# Patient Record
Sex: FEMALE | Race: WHITE | NOT HISPANIC OR LATINO | Employment: FULL TIME | URBAN - METROPOLITAN AREA
[De-identification: names, ages, dates, MRNs, and addresses within clinical notes are randomized per-mention and may not be internally consistent; named-entity substitution may affect disease eponyms.]

---

## 2017-01-11 ENCOUNTER — CLINICAL SUPPORT (OUTPATIENT)
Dept: OBSTETRICS AND GYNECOLOGY | Facility: CLINIC | Age: 27
End: 2017-01-11
Payer: COMMERCIAL

## 2017-01-11 ENCOUNTER — OFFICE VISIT (OUTPATIENT)
Dept: FAMILY MEDICINE | Facility: CLINIC | Age: 27
End: 2017-01-11
Payer: COMMERCIAL

## 2017-01-11 VITALS
HEIGHT: 67 IN | SYSTOLIC BLOOD PRESSURE: 107 MMHG | OXYGEN SATURATION: 98 % | TEMPERATURE: 98 F | DIASTOLIC BLOOD PRESSURE: 74 MMHG | WEIGHT: 171.94 LBS | BODY MASS INDEX: 26.99 KG/M2 | HEART RATE: 95 BPM

## 2017-01-11 DIAGNOSIS — F32.A DEPRESSION, UNSPECIFIED DEPRESSION TYPE: ICD-10-CM

## 2017-01-11 DIAGNOSIS — F41.1 GENERALIZED ANXIETY DISORDER: Primary | ICD-10-CM

## 2017-01-11 DIAGNOSIS — Z30.42 DEPO-PROVERA CONTRACEPTIVE STATUS: Primary | ICD-10-CM

## 2017-01-11 DIAGNOSIS — Z30.42 SURVEILLANCE FOR DEPO-PROVERA CONTRACEPTION: ICD-10-CM

## 2017-01-11 LAB
B-HCG UR QL: NEGATIVE
CTP QC/QA: YES

## 2017-01-11 PROCEDURE — 99214 OFFICE O/P EST MOD 30 MIN: CPT | Mod: S$GLB,,, | Performed by: FAMILY MEDICINE

## 2017-01-11 PROCEDURE — 1159F MED LIST DOCD IN RCRD: CPT | Mod: S$GLB,,, | Performed by: FAMILY MEDICINE

## 2017-01-11 PROCEDURE — 81025 URINE PREGNANCY TEST: CPT | Mod: S$GLB,,, | Performed by: OBSTETRICS & GYNECOLOGY

## 2017-01-11 PROCEDURE — 99999 PR PBB SHADOW E&M-EST. PATIENT-LVL III: CPT | Mod: PBBFAC,,, | Performed by: FAMILY MEDICINE

## 2017-01-11 PROCEDURE — 96372 THER/PROPH/DIAG INJ SC/IM: CPT | Mod: S$GLB,,, | Performed by: OBSTETRICS & GYNECOLOGY

## 2017-01-11 RX ORDER — BUSPIRONE HYDROCHLORIDE 7.5 MG/1
7.5 TABLET ORAL 2 TIMES DAILY
Qty: 60 TABLET | Refills: 2 | Status: SHIPPED | OUTPATIENT
Start: 2017-01-11 | End: 2017-01-11 | Stop reason: SDUPTHER

## 2017-01-11 RX ORDER — CLONAZEPAM 0.5 MG/1
0.5 TABLET ORAL 2 TIMES DAILY PRN
Qty: 30 TABLET | Refills: 0 | Status: SHIPPED | OUTPATIENT
Start: 2017-01-11 | End: 2017-02-06 | Stop reason: SDUPTHER

## 2017-01-11 RX ORDER — BUSPIRONE HYDROCHLORIDE 7.5 MG/1
7.5 TABLET ORAL 2 TIMES DAILY
Qty: 60 TABLET | Refills: 2 | Status: SHIPPED | OUTPATIENT
Start: 2017-01-11 | End: 2017-03-31

## 2017-01-11 RX ADMIN — MEDROXYPROGESTERONE ACETATE 150 MG: 150 INJECTION, SUSPENSION INTRAMUSCULAR at 01:01

## 2017-01-11 NOTE — MR AVS SNAPSHOT
[address]  [place] [address]  Phone: [phone]  Fax: [phone]                  Stephanie Harding   2017 3:20 PM   Office Visit    Description:  Female : 1990   Provider:  Aydin Wang MD   Department:  [facility]           Reason for Visit     Medication Problem           Diagnoses this Visit        Comments    Generalized anxiety disorder    -  Primary     Depression, unspecified depression type                To Do List           Future Appointments        Provider Department Dept Phone    2017 4:00 PM LYNN, [place] - [phone]      Goals (5 Years of Data)     None      Follow-Up and Disposition     Return in about 4 weeks (around 2017), or if symptoms worsen or fail to improve.       These Medications        Disp Refills Start End    busPIRone (BUSPAR) 7.5 MG tablet 60 tablet 2 2017    Take 1 tablet (7.5 mg total) by mouth 2 (two) times daily. - Oral    Pharmacy: [id number] [place] [facility] [address] [facility]: [phone]       clonazePAM (KLONOPIN) 0.5 MG tablet 30 tablet 0 2017    Take 1 tablet (0.5 mg total) by mouth 2 (two) times daily as needed for Anxiety (insomnia). - Oral    Pharmacy: [facility] #[id number] - [place] - [address] Ph #: [phone]         [facility] On [facility]     [facility] On Call Nurse Care Line -  Assistance  Registered nurses in the [facility] provide clinical advisement, health education, appointment booking, and other advisory services.  Call for this free service at [phone].             Medications           Message regarding Medications     Verify the changes and/or additions to your medication regime listed below are the same as discussed with your clinician today.  If any of these changes or additions are incorrect, please notify your healthcare provider.        START taking these  "NEW medications        Refills    busPIRone (BUSPAR) 7.5 MG tablet 2    Sig: Take 1 tablet (7.5 mg total) by mouth 2 (two) times daily.    Class: Normal    Route: Oral    clonazePAM (KLONOPIN) 0.5 MG tablet 0    Sig: Take 1 tablet (0.5 mg total) by mouth 2 (two) times daily as needed for Anxiety (insomnia).    Class: Print    Route: Oral           Verify that the below list of medications is an accurate representation of the medications you are currently taking.  If none reported, the list may be blank. If incorrect, please contact your healthcare provider. Carry this list with you in case of emergency.           Current Medications     buPROPion (WELLBUTRIN XL) 300 MG 24 hr tablet Take 1 tablet (300 mg total) by mouth once daily.    citalopram (CELEXA) 10 MG tablet Take 1 tablet (10 mg total) by mouth once daily.    busPIRone (BUSPAR) 7.5 MG tablet Take 1 tablet (7.5 mg total) by mouth 2 (two) times daily.    cholestyramine-aspartame (CHOLESTYRAMINE LIGHT) 4 gram PwPk Take 1 packet (4 g total) by mouth once daily.    clonazePAM (KLONOPIN) 0.5 MG tablet Take 1 tablet (0.5 mg total) by mouth 2 (two) times daily as needed for Anxiety (insomnia).    triamcinolone acetonide 0.1% (KENALOG) 0.1 % ointment Apply topically 2 (two) times daily.           Clinical Reference Information           Vital Signs - Last Recorded  Most recent update: 1/11/2017  3:34 PM by Jessy Mcmanus LPN    BP Pulse Temp Ht Wt SpO2    107/74 (BP Location: Right arm, Patient Position: Sitting, BP Method: Automatic) 95 98.2 °F (36.8 °C) (Oral) 5' 7" (1.702 m) 78 kg (171 lb 15.3 oz) 98%    BMI                26.93 kg/m2          Blood Pressure          Most Recent Value    BP  107/74      Allergies as of 1/11/2017     No Known Allergies      Immunizations Administered on Date of Encounter - 1/11/2017     None      Smoking Cessation     If you would like to quit smoking:   You may be eligible for free services if you are a [place] resident and " started smoking cigarettes before September 1, 1988.  Call the [employer]oking [employer] ([employer]) toll free at (637) 631-1222 or (857) 344-1214.   Call 5-230-QUIT-NOW if you do not meet the above criteria.

## 2017-01-11 NOTE — PROGRESS NOTES
Subjective:       Patient ID: Stephanie Harding is a 26 y.o. female.    Chief Complaint: Medication Problem    HPI Comments: 26 yr old pleasant white female with acne vulgaris, generalized anxiety disorder, presents today for evaluation of anxiety and depression.    Acne vulgaris - chronic - currently stable - she is on clindamycin gel -      Anxiety disorder/depression - chronic - and currently uncontrolled - on wellbutrin daily and also celexa - no SI/HI/hallucinations - reports have issues with her family life and small things making her more anxious and depressed mood - no motivation or energy and sleep issues - she is on meds x 7 years and they seems to have no affect during her emotional moments - she would like to change it      History as below - reviewed      Health maintenance  -labs she reports UTD  -pap UTD  -vaccines declines    Medication Refill   This is a chronic problem. The current episode started more than 1 year ago. The problem occurs constantly. The problem has been gradually improving. Associated symptoms include a rash. Pertinent negatives include no abdominal pain, anorexia, arthralgias, chest pain, chills, congestion, coughing, diaphoresis, fever, headaches, myalgias, nausea, numbness, sore throat, vertigo, vomiting or weakness. Nothing aggravates the symptoms. Treatments tried: as below. The treatment provided significant relief.   Rash   This is a chronic problem. The current episode started more than 1 year ago. The problem has been gradually improving since onset. The rash is characterized by swelling and draining. She was exposed to nothing. Pertinent negatives include no anorexia, congestion, cough, eye pain, facial edema, fever, nail changes, sore throat or vomiting. Past treatments include antibiotic cream. The treatment provided no relief. There is no history of allergies, asthma, eczema or varicella. (Acne)   Anxiety   Presents for initial visit. Onset was 1 to 5 years ago. The  problem has been rapidly improving. Symptoms include decreased concentration, excessive worry and nervous/anxious behavior. Patient reports no chest pain, confusion, depressed mood, dry mouth, feeling of choking, impotence, irritability, nausea, palpitations, restlessness or suicidal ideas. Symptoms occur rarely. The severity of symptoms is mild. Nothing aggravates the symptoms. The quality of sleep is good. Nighttime awakenings: none.     There are no known risk factors. Her past medical history is significant for anxiety/panic attacks. There is no history of asthma, bipolar disorder, CAD, chronic lung disease, depression, fibromyalgia or suicide attempts. (Acne) Past treatments include non-benzodiazephine anxiolytics. The treatment provided significant relief. Compliance with prior treatments has been good.     Review of Systems   Constitutional: Negative.  Negative for activity change, chills, diaphoresis, fever, irritability and unexpected weight change.   HENT: Negative.  Negative for congestion, dental problem, ear pain, mouth sores, sinus pressure and sore throat.    Eyes: Negative.  Negative for photophobia, pain and discharge.   Respiratory: Negative.  Negative for apnea, cough and wheezing.    Cardiovascular: Negative.  Negative for chest pain and palpitations.   Gastrointestinal: Negative.  Negative for abdominal pain, anorexia, blood in stool, nausea and vomiting.   Endocrine: Negative.  Negative for cold intolerance, polydipsia and polyuria.   Genitourinary: Negative.  Negative for dyspareunia, flank pain, hematuria, impotence, urgency and vaginal discharge.   Musculoskeletal: Negative.  Negative for arthralgias and myalgias.   Skin: Positive for rash. Negative for color change and nail changes.   Allergic/Immunologic: Negative.  Negative for environmental allergies and immunocompromised state.   Neurological: Negative.  Negative for vertigo, speech difficulty, weakness, numbness and headaches.    Hematological: Negative.    Psychiatric/Behavioral: Positive for decreased concentration, dysphoric mood and sleep disturbance. Negative for agitation, confusion, hallucinations and suicidal ideas. The patient is nervous/anxious.        PMH/PSH/FH/SH/MED/ALLERGY reviewed    Objective:       Vitals:    01/11/17 1533   BP: 107/74   Pulse: 95   Temp: 98.2 °F (36.8 °C)       Physical Exam   Constitutional: She is oriented to person, place, and time. She appears well-developed and well-nourished. No distress.   HENT:   Head: Normocephalic and atraumatic.   Right Ear: External ear normal.   Left Ear: External ear normal.   Nose: Nose normal.   Mouth/Throat: Oropharynx is clear and moist. No oropharyngeal exudate.   Mild to moderate nodular acne on face   Eyes: Conjunctivae and EOM are normal. Pupils are equal, round, and reactive to light. Right eye exhibits no discharge. Left eye exhibits no discharge. No scleral icterus.   Neck: Normal range of motion. Neck supple. No JVD present. No tracheal deviation present. No thyromegaly present.   Cardiovascular: Normal rate, regular rhythm, normal heart sounds and intact distal pulses.  Exam reveals no gallop and no friction rub.    No murmur heard.  Pulmonary/Chest: Effort normal and breath sounds normal. No respiratory distress. She has no wheezes. She has no rales. She exhibits no tenderness.   Abdominal: Soft. Bowel sounds are normal. She exhibits no distension and no mass. There is no tenderness. There is no rebound and no guarding. No hernia.   Musculoskeletal: Normal range of motion. She exhibits no edema or tenderness.   Lymphadenopathy:     She has no cervical adenopathy.   Neurological: She is alert and oriented to person, place, and time. She has normal reflexes. She displays normal reflexes. No cranial nerve deficit. She exhibits normal muscle tone. Coordination normal.   Skin: Skin is warm and dry. Rash noted. She is not diaphoretic. There is erythema.    Psychiatric: Her behavior is normal. Judgment and thought content normal.   Anxious and depressed mood       Assessment:       1. Generalized anxiety disorder    2. Depression, unspecified depression type        Plan:       Stephanie was seen today for medication problem.    Diagnoses and all orders for this visit:    Generalized anxiety disorder  -     Discontinue: busPIRone (BUSPAR) 7.5 MG tablet; Take 1 tablet (7.5 mg total) by mouth 2 (two) times daily.  -     clonazePAM (KLONOPIN) 0.5 MG tablet; Take 1 tablet (0.5 mg total) by mouth 2 (two) times daily as needed for Anxiety (insomnia).  -     busPIRone (BUSPAR) 7.5 MG tablet; Take 1 tablet (7.5 mg total) by mouth 2 (two) times daily.    Depression, unspecified depression type  -     Discontinue: busPIRone (BUSPAR) 7.5 MG tablet; Take 1 tablet (7.5 mg total) by mouth 2 (two) times daily.  -     busPIRone (BUSPAR) 7.5 MG tablet; Take 1 tablet (7.5 mg total) by mouth 2 (two) times daily.      MEENU/depression  =slowly wean celexa and wellbutrin x 1 month  -start buspar daily  -klonopin as needed once or twice/day  -recommend relaxation techniques as well  -SI/ER precautions given and also we discussed need for Psychiatry referral if meds not working in future    Spent adequate time in obtaining history and explaining differentials    40 minutes spent during this visit of which greater than 50% devoted to face-face counseling and coordination of care regarding diagnosis and management plan    Return in about 4 weeks (around 2/8/2017), or if symptoms worsen or fail to improve.

## 2017-01-11 NOTE — PROGRESS NOTES
UPT negative   Administered Depo Provera 150 mg/ml   1 ml IM inj in R upper quad gluteus  Patient Tolerated well.  Patient instructed to return on 4-4-17 for next injection.   Patient verbalized understanding.   Lot:[id number]  Exp: 08/2019

## 2017-01-20 ENCOUNTER — PATIENT MESSAGE (OUTPATIENT)
Dept: SLEEP MEDICINE | Facility: CLINIC | Age: 27
End: 2017-01-20

## 2017-01-26 ENCOUNTER — TELEPHONE (OUTPATIENT)
Dept: SLEEP MEDICINE | Facility: CLINIC | Age: 27
End: 2017-01-26

## 2017-01-26 ENCOUNTER — PATIENT MESSAGE (OUTPATIENT)
Dept: GASTROENTEROLOGY | Facility: CLINIC | Age: 27
End: 2017-01-26

## 2017-01-26 DIAGNOSIS — G47.30 SLEEP APNEA, UNSPECIFIED TYPE: Primary | ICD-10-CM

## 2017-01-26 NOTE — TELEPHONE ENCOUNTER
I will order a HOME STUDY.    Benja, Please consider allowing her boyfriend to  [place] (she works at [employer] and can not leave work)    Thank you!    ---------------------------------------------------    Dear Ms. Harding,    Yes, I can order a home study.  I think we should do it first, because if we do not get very good results with this one, insurance will pay for the one done in the sleep lab, so please save your resources.   I will ask the lab to allow your bf to  the study device (normally they would want you to pick it up to be able to teach you to place it correctly to insure better outcomes).    SLEEP LAB ([facility] or [facility]) will contact you to schedule the HOME sleep study. Their number is [phone] (ext 1). Please call them if you do not hear from them in 10 business days from now.  The [facility] is located on 7th floor of the [facility]; [place] lab is located in [facility].    SLEEP CLINIC (my assistant) will call you when the sleep study results are ready - if you have not heard from us by 2 weeks from the date of the study, please call [phone] (ext 2) or you can use [facility] to contact me.    You are advised to abstain from driving should you feel sleepy or drowsy.        Sincerely,    Marley Seth MD    ===View-only below this line===      ----- Message -----     From: Stephanie Harding     Sent: 1/26/2017  3:54 PM CST       To: Marley Seth MD  Subject: RE: Visit Follow-Up    I am also going to consider paying the out of pocket price to get the in clinic test done, if this is possible. I remember you saying that the at home one was not as accurate or didn't provide as much information as the one that was performed in the clinic? Do you know if it would still be possible to do it in a clinic without insurance?  ----- Message -----  From: Marley Seth MD  Sent: 1/23/2017  1:32 PM CST  To: Stephanie Harding  Subject: RE: Visit Follow-Up        Dear Ms.  "Harding       I can just re-order the sleep study for you and see you once the results are ready.  Unfortunately, we are not doing [facility] anymore (we only have [facility] and [facility] for inlab and [facility] only for home studies).  Last time I order you a sleep lab study (in the hospital overnight) - did you insurance deny it and request a home study instead or was that just your preference?  Please let me know, and I will reorder the study for you.      Sincerely,    Marley Seth MD          Sincerely,    Marley Seth MD      ----- Message -----     From: Stephanie Harding     Sent: 1/20/2017  1:06 PM CST       To: Marley Seth MD  Subject: Visit Follow-Up    [facility] doctor,    I know that it has been a couple of months since I have seen you and I was not able to complete the sleep study. I ended up moving to the [facility], so I was not local to any of the clinics. I am writing you with a concern not only for myself but for my relationship. My main concern is that I don't feel well rested when I wake up in the morning, even after 8-9 hours of sleep. My boyfriend states that I no longer "stop" breathing while I am sleeping, but I snore very loudly. It has so much gotten to the point that we no longer sleep in the same bed. If you think the test you had ordered would help get to the bottom of my issue, please let me know where in [place] I may be able to  at the at home test. Otherwise I am thinking that I will need to schedule an appointment to be seen, but noticed that you are several months booked out. Please let me know your thoughts, as I am not sure I will be able to wait until March or April.    Thanks,  Stephanie  "

## 2017-02-04 ENCOUNTER — PATIENT MESSAGE (OUTPATIENT)
Dept: FAMILY MEDICINE | Facility: CLINIC | Age: 27
End: 2017-02-04

## 2017-02-04 DIAGNOSIS — F41.1 GENERALIZED ANXIETY DISORDER: ICD-10-CM

## 2017-02-06 RX ORDER — CLONAZEPAM 1 MG/1
1 TABLET ORAL 2 TIMES DAILY PRN
Qty: 60 TABLET | Refills: 0 | Status: SHIPPED | OUTPATIENT
Start: 2017-02-06 | End: 2017-02-19 | Stop reason: SDUPTHER

## 2017-02-08 ENCOUNTER — TELEPHONE (OUTPATIENT)
Dept: SLEEP MEDICINE | Facility: OTHER | Age: 27
End: 2017-02-08

## 2017-02-08 ENCOUNTER — PATIENT MESSAGE (OUTPATIENT)
Dept: FAMILY MEDICINE | Facility: CLINIC | Age: 27
End: 2017-02-08

## 2017-02-15 ENCOUNTER — PATIENT MESSAGE (OUTPATIENT)
Dept: GASTROENTEROLOGY | Facility: CLINIC | Age: 27
End: 2017-02-15

## 2017-02-15 NOTE — TELEPHONE ENCOUNTER
Spoke with pt and offered to move her appointment up with Dr Alatorre but pt wants to keep 3/8/17. Pt was last seen by Dr Nam in 3/2016 so pt needs to see Dr Alatorre before being schedule for Colonoscopy.

## 2017-02-17 ENCOUNTER — PATIENT MESSAGE (OUTPATIENT)
Dept: FAMILY MEDICINE | Facility: CLINIC | Age: 27
End: 2017-02-17

## 2017-02-17 DIAGNOSIS — F41.1 GENERALIZED ANXIETY DISORDER: ICD-10-CM

## 2017-02-19 NOTE — TELEPHONE ENCOUNTER
Prescription will be canceled at [facility], patient requesting RX sent to [facility].

## 2017-02-20 RX ORDER — CLONAZEPAM 1 MG/1
1 TABLET ORAL 2 TIMES DAILY PRN
Qty: 60 TABLET | Refills: 2 | Status: SHIPPED | OUTPATIENT
Start: 2017-02-20 | End: 2017-02-21 | Stop reason: SDUPTHER

## 2017-02-21 ENCOUNTER — PATIENT MESSAGE (OUTPATIENT)
Dept: FAMILY MEDICINE | Facility: CLINIC | Age: 27
End: 2017-02-21

## 2017-02-21 ENCOUNTER — PATIENT MESSAGE (OUTPATIENT)
Dept: GASTROENTEROLOGY | Facility: CLINIC | Age: 27
End: 2017-02-21

## 2017-02-21 DIAGNOSIS — F41.1 GENERALIZED ANXIETY DISORDER: ICD-10-CM

## 2017-02-21 RX ORDER — CLONAZEPAM 1 MG/1
1 TABLET ORAL 2 TIMES DAILY PRN
Qty: 60 TABLET | Refills: 2 | Status: SHIPPED | OUTPATIENT
Start: 2017-02-21 | End: 2017-03-31

## 2017-02-22 RX ORDER — DICYCLOMINE HYDROCHLORIDE 20 MG/1
20 TABLET ORAL EVERY 6 HOURS
Qty: 120 TABLET | Refills: 0 | Status: SHIPPED | OUTPATIENT
Start: 2017-02-22 | End: 2017-03-24

## 2017-03-01 ENCOUNTER — TELEPHONE (OUTPATIENT)
Dept: GASTROENTEROLOGY | Facility: CLINIC | Age: 27
End: 2017-03-01

## 2017-03-01 ENCOUNTER — OFFICE VISIT (OUTPATIENT)
Dept: GASTROENTEROLOGY | Facility: CLINIC | Age: 27
End: 2017-03-01
Payer: COMMERCIAL

## 2017-03-01 VITALS
SYSTOLIC BLOOD PRESSURE: 109 MMHG | HEIGHT: 67 IN | BODY MASS INDEX: 27.09 KG/M2 | WEIGHT: 172.63 LBS | HEART RATE: 86 BPM | DIASTOLIC BLOOD PRESSURE: 70 MMHG

## 2017-03-01 DIAGNOSIS — R10.30 LOWER ABDOMINAL PAIN: Primary | Chronic | ICD-10-CM

## 2017-03-01 DIAGNOSIS — F32.A DEPRESSION, UNSPECIFIED DEPRESSION TYPE: ICD-10-CM

## 2017-03-01 DIAGNOSIS — F41.1 GENERALIZED ANXIETY DISORDER: ICD-10-CM

## 2017-03-01 DIAGNOSIS — K58.0 IRRITABLE BOWEL SYNDROME WITH DIARRHEA: Chronic | ICD-10-CM

## 2017-03-01 DIAGNOSIS — R11.2 NON-INTRACTABLE VOMITING WITH NAUSEA, UNSPECIFIED VOMITING TYPE: ICD-10-CM

## 2017-03-01 PROCEDURE — 1160F RVW MEDS BY RX/DR IN RCRD: CPT | Mod: S$GLB,,, | Performed by: INTERNAL MEDICINE

## 2017-03-01 PROCEDURE — 99215 OFFICE O/P EST HI 40 MIN: CPT | Mod: S$GLB,,, | Performed by: INTERNAL MEDICINE

## 2017-03-01 PROCEDURE — 99999 PR PBB SHADOW E&M-EST. PATIENT-LVL III: CPT | Mod: PBBFAC,,, | Performed by: INTERNAL MEDICINE

## 2017-03-01 RX ORDER — PANTOPRAZOLE SODIUM 40 MG/1
40 TABLET, DELAYED RELEASE ORAL DAILY
Qty: 30 TABLET | Refills: 3 | Status: SHIPPED | OUTPATIENT
Start: 2017-03-01 | End: 2017-09-20

## 2017-03-01 NOTE — PATIENT INSTRUCTIONS
-EGD to be schedule at [facility]  -Abdominal ultrasound  -Blood work to be done today\  -Trial of Protonix 40 mg every morning before meals  -Consideration of psychiatric/psychological counseling discussed with patient  -Recommended trial of probiotic therapy

## 2017-03-01 NOTE — MR AVS SNAPSHOT
[facility]  [address]  [address]  [place] [address]  Phone: [phone]                  Stephanie Harding   3/1/2017 8:00 AM   Office Visit    Description:  Female : 1990   Provider:  Jono Alatorre MD   Department:  [place] - Gastroenterology           Reason for Visit     GI Problem           Diagnoses this Visit        Comments    Lower abdominal pain    -  Primary Increased pain immediately postprandially, with immediate relief by having a bowel movement    Depression, unspecified depression type         Generalized anxiety disorder         Irritable bowel syndrome with diarrhea     Constipation generated with cholestyramine use; no weight loss; no bright red blood per rectum    Non-intractable vomiting with nausea, unspecified vomiting type                To Do List           Future Appointments        Provider Department Dept Phone    3/8/2017 1:00 PM HOME STUDY, SLEEP [facility] [phone]    2017 4:00 PM INJECTION, [place] - [phone]      Goals (5 Years of Data)     None      [facility]     [facility] Care [facility] -  Assistance  Registered nurses in the [facility] provide clinical advisement, health education, appointment booking, and other advisory services.  Call for this free service at [phone].             Medications           Message regarding Medications     Verify the changes and/or additions to your medication regime listed below are the same as discussed with your clinician today.  If any of these changes or additions are incorrect, please notify your healthcare provider.        STOP taking these medications     buPROPion (WELLBUTRIN XL) 300 MG 24 hr tablet Take 1 tablet (300 mg total) by mouth once daily.    citalopram (CELEXA) 10 MG tablet Take 1 tablet (10 mg total) by mouth once daily.    cholestyramine-aspartame (CHOLESTYRAMINE LIGHT) 4 gram PwPk Take 1  packet (4 g total) by mouth once daily.    triamcinolone acetonide 0.1% (KENALOG) 0.1 % ointment Apply topically 2 (two) times daily.           Verify that the below list of medications is an accurate representation of the medications you are currently taking.  If none reported, the list may be blank. If incorrect, please contact your healthcare provider. Carry this list with you in case of emergency.           Current Medications     busPIRone (BUSPAR) 7.5 MG tablet Take 1 tablet (7.5 mg total) by mouth 2 (two) times daily.    clonazePAM (KLONOPIN) 1 MG tablet Take 1 tablet (1 mg total) by mouth 2 (two) times daily as needed for Anxiety (insomnia).    dicyclomine (BENTYL) 20 mg tablet Take 1 tablet (20 mg total) by mouth every 6 (six) hours.           Clinical Reference Information           Your Vitals Were     BP                   109/70 (BP Location: Right arm, Patient Position: Sitting, BP Method: Automatic)           Blood Pressure          Most Recent Value    BP  109/70      Allergies as of 3/1/2017     No Known Allergies      Immunizations Administered on Date of Encounter - 3/1/2017     None      Orders Placed During Today's Visit      Normal Orders This Visit    Case request GI: ESOPHAGOGASTRODUODENOSCOPY (EGD)     Future Labs/Procedures Expected by Expires    CBC auto differential  3/1/2017 3/1/2018    Comprehensive metabolic panel  3/1/2017 3/1/2018    TSH  3/1/2017 3/1/2018    US Abdomen Complete  3/1/2017 3/2/2018      Instructions    -EGD to be schedule at [facility]  -Abdominal ultrasound  -Blood work to be done today\  -Trial of Protonix 40 mg every morning before meals  -Consideration of psychiatric/psychological counseling discussed with patient  -Recommended trial of probiotic therapy       Smoking Cessation     If you would like to quit smoking:   You may be eligible for free services if you are a [place] resident and started smoking cigarettes before September 1, 1988.  Call the Smoking  [facility] ([facility]) toll free at [phone] or [phone] [phone].   Call 1-800-QUIT-NOW if you do not meet the above criteria.            Language Assistance Services     ATTENTION: Language assistance services are available, free of charge. Please call [phone].      ATENCIÓN: Si habla dorieañol, tiene a dorantes disposición servicios gratuitos de asistencia lingüística. Llame al [phone].     [facility] Ý: N?u b?n nói Ti?ng Vi?t, có các d?ch v? h? tr? ngôn ng? mi?n phí dành cho b?n. G?i s? [phone].         [facility] complies with applicable Federal civil rights laws and does not discriminate on the basis of race, color, national origin, age, disability, or sex.

## 2017-03-01 NOTE — PROGRESS NOTES
[facility] / [facility]     Chief complaint: GI Problem (upset stomach )       PCP: Aydin Wang MD    HPI: 27 y.o. female presents for her first follow-up visit since her initial evaluation in March 2016.  In the interim, she has decided Questran therapy caused uncomfortable constipation for her and ceased this medication although did control the loose stools.  She also decided not to complete the recommended EGD evaluation.  At present, she has recently undergone a change in her antidepressant medications, and feels that she is having daily left upper quadrant and/or lower abdominal pains that spike in intensity immediately postprandially, and are generally relieved with bowel movements.  Her normal bowel pattern currently is 1 bowel movement 4-10 times daily, generally after she eats anything.  There is no associated bright red blood per rectum or melena.  She also has nausea approximately every other day, and this nausea lasts for a couple of hours.  She notes that all her symptoms are increased with increased stress.  Despite the GI symptoms, the patient's weight has actually increased slightly over the past year and thus far, she has not had any benefit from Bentyl therapy which was recently ascribed for her.    I reviewed with the patient the results of her February 2016 lab data, all which was normal, and I also discussed the concept of irritable bowel syndrome, the linkage to anxiety/stress disorder, and possible need for probiotic therapy and/or improved control of her anxiety/depression symptoms.    Patient is accompanied by significant other.    Past Medical History:   Diagnosis Date    Anxiety     HSV-2 (herpes simplex virus 2) infection 2014       Review of Systems  Review of Systems   Constitutional: Positive for fatigue. Negative for appetite change, chills, diaphoresis, fever and unexpected weight change.   HENT: Negative for postnasal drip, trouble swallowing and voice change.    Eyes:  "Negative for visual disturbance.   Respiratory: Negative for cough, chest tightness, shortness of breath and wheezing.    Cardiovascular: Negative.    Gastrointestinal: Positive for abdominal distention, abdominal pain, diarrhea, nausea and vomiting. Negative for anal bleeding, blood in stool, constipation and rectal pain.   Endocrine: Negative for cold intolerance and polyuria.   Genitourinary: Negative for difficulty urinating, frequency, urgency and vaginal bleeding.   Musculoskeletal: Negative for arthralgias, back pain and gait problem.   Skin: Negative.         Multiple tattoos obtained over the years   Allergic/Immunologic: Negative for environmental allergies and food allergies.   Neurological: Negative for seizures, speech difficulty and headaches.   Hematological: Does not bruise/bleed easily.   Psychiatric/Behavioral: The patient is nervous/anxious.        Physical Examination  /70 (BP Location: Right arm, Patient Position: Sitting, BP Method: Automatic)  Pulse 86  Ht 5' 7" (1.702 m)  Wt 78.3 kg (172 lb 9.9 oz)  BMI 27.04 kg/m2  Wt Readings from Last 1 Encounters:   03/01/17 0823 78.3 kg (172 lb 9.9 oz)       Physical Exam   Constitutional: She is oriented to person, place, and time. Vital signs are normal. She appears well-developed and well-nourished.   HENT:   Head: Normocephalic and atraumatic.   Right Ear: External ear normal.   Left Ear: External ear normal.   Nose: Nose normal.   Mouth/Throat: Uvula is midline and oropharynx is clear and moist. No oropharyngeal exudate.   Eyes: Conjunctivae, EOM and lids are normal. Pupils are equal, round, and reactive to light. No scleral icterus.   Neck: Trachea normal. Neck supple. No JVD present. No tracheal tenderness and no muscular tenderness present. No tracheal deviation and no edema present. No thyromegaly present.   Cardiovascular: Normal rate, regular rhythm, normal heart sounds and normal pulses.  Exam reveals no S3 and no S4.    No murmur " heard.  Pulmonary/Chest: Effort normal and breath sounds normal. No stridor.   Abdominal: Soft. Normal appearance and bowel sounds are normal. She exhibits no distension, no pulsatile liver, no fluid wave, no abdominal bruit, no pulsatile midline mass and no mass. There is no hepatosplenomegaly. There is no tenderness. There is no rebound and no guarding. No hernia.   Mild to moderate obesity, nontender throughout examined area   Musculoskeletal: Normal range of motion. She exhibits no edema.   Neurological: She is alert and oriented to person, place, and time. She exhibits normal muscle tone. Coordination normal.   Skin: Skin is warm and dry. No petechiae and no rash noted. No cyanosis. Nails show no clubbing.   Psychiatric: She has a normal mood and affect. Her speech is normal and behavior is normal. Judgment normal. Cognition and memory are normal.   Nursing note and vitals reviewed.    Labs:   Complete Blood Count  Lab Results   Component Value Date    RBC 4.66 02/26/2016    HGB 14.1 02/26/2016    HCT 42.6 02/26/2016    MCV 91 02/26/2016    MCH 30.3 02/26/2016    MCHC 33.1 02/26/2016    RDW 13.1 02/26/2016     02/26/2016    MPV 8.8 (L) 02/26/2016    GRAN 4.9 02/26/2016    GRAN 60.7 02/26/2016    LYMPH 2.4 02/26/2016    LYMPH 29.7 02/26/2016    MONO 0.6 02/26/2016    MONO 7.2 02/26/2016    EOS 0.1 02/26/2016    BASO 0.03 02/26/2016    EOSINOPHIL 1.6 02/26/2016    BASOPHIL 0.4 02/26/2016    DIFFMETHOD Automated 02/26/2016       Comprehensive Metabolic Panel  Lab Results   Component Value Date    GLU 83 02/26/2016    BUN 15 02/26/2016    CREATININE 1.0 02/26/2016     02/26/2016    K 3.8 02/26/2016     02/26/2016    PROT 7.3 02/26/2016    ALBUMIN 3.9 02/26/2016    BILITOT 0.4 02/26/2016    AST 23 02/26/2016    ALKPHOS 84 02/26/2016    CO2 24 02/26/2016    ALT 22 02/26/2016    ANIONGAP 8 02/26/2016    EGFRNONAA >60 02/26/2016    ESTGFRAFRICA >60 02/26/2016       TSH  Lab Results   Component Value  Date    TSH 1.204 02/26/2016         Assessment:   Lower abdominal pain  Comments:  Increased pain immediately postprandially, with immediate relief by having a bowel movement  Orders:  -     US Abdomen Complete; Future; Expected date: 3/1/17  -     CBC auto differential; Future; Expected date: 3/1/17  -     Comprehensive metabolic panel; Future; Expected date: 3/1/17  -     TSH; Future; Expected date: 3/1/17  -     Case request GI: ESOPHAGOGASTRODUODENOSCOPY (EGD)    Depression, unspecified depression type    Generalized anxiety disorder    Irritable bowel syndrome with diarrhea  Comments:  Constipation generated with cholestyramine use; no weight loss; no bright red blood per rectum  Orders:  -     US Abdomen Complete; Future; Expected date: 3/1/17  -     CBC auto differential; Future; Expected date: 3/1/17  -     Comprehensive metabolic panel; Future; Expected date: 3/1/17  -     TSH; Future; Expected date: 3/1/17  -     Case request GI: ESOPHAGOGASTRODUODENOSCOPY (EGD)    Non-intractable vomiting with nausea, unspecified vomiting type    Plan:    No Follow-up on file.    Orders Placed This Encounter   Procedures    US Abdomen Complete    CBC auto differential    Comprehensive metabolic panel    TSH     -EGD to be schedule at [facility]  -Abdominal ultrasound  -Blood work to be done today\  -Trial of Protonix 40 mg every morning before meals  -Consideration of psychiatric/psychological counseling discussed with patient  -Recommended trial of probiotic therapy      Jono Alatorre MD, FACP, FACG, AGAF [facility]  [address], [address], [place] [address]  Phone: [phone] Fax: [phone]    [address], [address], [place] [address]  Phone: [phone] Fax: [phone]    - Portions of this note were dictated using voice recognition software and may contain dictation related errors in spelling / grammar / syntax not discovered on text review.

## 2017-03-07 ENCOUNTER — TELEPHONE (OUTPATIENT)
Dept: SLEEP MEDICINE | Facility: OTHER | Age: 27
End: 2017-03-07

## 2017-03-08 ENCOUNTER — HOSPITAL ENCOUNTER (OUTPATIENT)
Dept: SLEEP MEDICINE | Facility: OTHER | Age: 27
Discharge: HOME | End: 2017-03-08
Attending: PSYCHIATRY & NEUROLOGY
Payer: COMMERCIAL

## 2017-03-08 ENCOUNTER — HOSPITAL ENCOUNTER (OUTPATIENT)
Dept: RADIOLOGY | Facility: HOSPITAL | Age: 27
Discharge: HOME | End: 2017-03-08
Attending: INTERNAL MEDICINE
Payer: COMMERCIAL

## 2017-03-08 DIAGNOSIS — K58.0 IRRITABLE BOWEL SYNDROME WITH DIARRHEA: Chronic | ICD-10-CM

## 2017-03-08 DIAGNOSIS — G47.30 SLEEP APNEA, UNSPECIFIED TYPE: ICD-10-CM

## 2017-03-08 DIAGNOSIS — R10.30 LOWER ABDOMINAL PAIN: Chronic | ICD-10-CM

## 2017-03-08 DIAGNOSIS — G47.33 OSA (OBSTRUCTIVE SLEEP APNEA): ICD-10-CM

## 2017-03-08 PROCEDURE — 95800 SLP STDY UNATTENDED: CPT

## 2017-03-08 PROCEDURE — 76700 US EXAM ABDOM COMPLETE: CPT | Mod: 26,,, | Performed by: RADIOLOGY

## 2017-03-08 PROCEDURE — 76700 US EXAM ABDOM COMPLETE: CPT | Mod: TC,PO

## 2017-03-08 PROCEDURE — 95810 POLYSOM 6/> YRS 4/> PARAM: CPT | Mod: 26,,, | Performed by: PSYCHIATRY & NEUROLOGY

## 2017-03-09 ENCOUNTER — ANESTHESIA (OUTPATIENT)
Dept: ENDOSCOPY | Facility: HOSPITAL | Age: 27
End: 2017-03-09
Payer: COMMERCIAL

## 2017-03-09 ENCOUNTER — SURGERY (OUTPATIENT)
Age: 27
End: 2017-03-09

## 2017-03-09 ENCOUNTER — HOSPITAL ENCOUNTER (OUTPATIENT)
Facility: HOSPITAL | Age: 27
Discharge: HOME | End: 2017-03-09
Attending: INTERNAL MEDICINE | Admitting: INTERNAL MEDICINE
Payer: COMMERCIAL

## 2017-03-09 ENCOUNTER — ANESTHESIA EVENT (OUTPATIENT)
Dept: ENDOSCOPY | Facility: HOSPITAL | Age: 27
End: 2017-03-09
Payer: COMMERCIAL

## 2017-03-09 VITALS
HEART RATE: 62 BPM | RESPIRATION RATE: 20 BRPM | OXYGEN SATURATION: 100 % | TEMPERATURE: 97 F | DIASTOLIC BLOOD PRESSURE: 59 MMHG | WEIGHT: 170 LBS | BODY MASS INDEX: 26.68 KG/M2 | SYSTOLIC BLOOD PRESSURE: 99 MMHG | HEIGHT: 67 IN

## 2017-03-09 DIAGNOSIS — R11.2 NON-INTRACTABLE VOMITING WITH NAUSEA, UNSPECIFIED VOMITING TYPE: ICD-10-CM

## 2017-03-09 DIAGNOSIS — R10.12 LEFT UPPER QUADRANT ABDOMINAL PAIN OF UNKNOWN ETIOLOGY: ICD-10-CM

## 2017-03-09 DIAGNOSIS — R10.30 LOWER ABDOMINAL PAIN: ICD-10-CM

## 2017-03-09 DIAGNOSIS — K58.0 IRRITABLE BOWEL SYNDROME WITH DIARRHEA: Primary | ICD-10-CM

## 2017-03-09 DIAGNOSIS — R11.2 NAUSEA & VOMITING: ICD-10-CM

## 2017-03-09 LAB
B-HCG UR QL: NEGATIVE
CTP QC/QA: YES

## 2017-03-09 PROCEDURE — 25000003 PHARM REV CODE 250: Performed by: NURSE ANESTHETIST, CERTIFIED REGISTERED

## 2017-03-09 PROCEDURE — 37000008 HC ANESTHESIA 1ST 15 MINUTES: Performed by: INTERNAL MEDICINE

## 2017-03-09 PROCEDURE — 88305 TISSUE EXAM BY PATHOLOGIST: CPT | Performed by: PATHOLOGY

## 2017-03-09 PROCEDURE — 63600175 PHARM REV CODE 636 W HCPCS: Performed by: NURSE ANESTHETIST, CERTIFIED REGISTERED

## 2017-03-09 PROCEDURE — 81025 URINE PREGNANCY TEST: CPT | Performed by: INTERNAL MEDICINE

## 2017-03-09 PROCEDURE — 43239 EGD BIOPSY SINGLE/MULTIPLE: CPT | Mod: ,,, | Performed by: INTERNAL MEDICINE

## 2017-03-09 PROCEDURE — 37000009 HC ANESTHESIA EA ADD 15 MINS: Performed by: INTERNAL MEDICINE

## 2017-03-09 PROCEDURE — 25000003 PHARM REV CODE 250: Performed by: INTERNAL MEDICINE

## 2017-03-09 PROCEDURE — 43239 EGD BIOPSY SINGLE/MULTIPLE: CPT | Performed by: INTERNAL MEDICINE

## 2017-03-09 PROCEDURE — 88305 TISSUE EXAM BY PATHOLOGIST: CPT | Mod: 26,,, | Performed by: PATHOLOGY

## 2017-03-09 PROCEDURE — 27201012 HC FORCEPS, HOT/COLD, DISP: Performed by: INTERNAL MEDICINE

## 2017-03-09 RX ORDER — FENTANYL CITRATE 50 UG/ML
INJECTION, SOLUTION INTRAMUSCULAR; INTRAVENOUS
Status: DISCONTINUED | OUTPATIENT
Start: 2017-03-09 | End: 2017-03-09

## 2017-03-09 RX ORDER — PROPOFOL 10 MG/ML
VIAL (ML) INTRAVENOUS CONTINUOUS PRN
Status: DISCONTINUED | OUTPATIENT
Start: 2017-03-09 | End: 2017-03-09

## 2017-03-09 RX ORDER — LIDOCAINE HCL/PF 100 MG/5ML
SYRINGE (ML) INTRAVENOUS
Status: DISCONTINUED | OUTPATIENT
Start: 2017-03-09 | End: 2017-03-09

## 2017-03-09 RX ORDER — PROPOFOL 10 MG/ML
VIAL (ML) INTRAVENOUS
Status: DISCONTINUED | OUTPATIENT
Start: 2017-03-09 | End: 2017-03-09

## 2017-03-09 RX ORDER — SODIUM CHLORIDE 9 MG/ML
INJECTION, SOLUTION INTRAVENOUS CONTINUOUS
Status: DISCONTINUED | OUTPATIENT
Start: 2017-03-10 | End: 2017-03-09 | Stop reason: HOSPADM

## 2017-03-09 RX ADMIN — FENTANYL CITRATE 50 MCG: 50 INJECTION, SOLUTION INTRAMUSCULAR; INTRAVENOUS at 01:03

## 2017-03-09 RX ADMIN — PROPOFOL 40 MG: 10 INJECTION, EMULSION INTRAVENOUS at 01:03

## 2017-03-09 RX ADMIN — PROPOFOL 60 MG: 10 INJECTION, EMULSION INTRAVENOUS at 01:03

## 2017-03-09 RX ADMIN — PROPOFOL 150 MCG/KG/MIN: 10 INJECTION, EMULSION INTRAVENOUS at 01:03

## 2017-03-09 RX ADMIN — SODIUM CHLORIDE: 0.9 INJECTION, SOLUTION INTRAVENOUS at 12:03

## 2017-03-09 RX ADMIN — LIDOCAINE HYDROCHLORIDE 100 MG: 20 INJECTION, SOLUTION INTRAVENOUS at 01:03

## 2017-03-09 NOTE — H&P (VIEW-ONLY)
[facility] / [place]     Chief complaint: GI Problem (upset stomach )       PCP: Aydin Wang MD    HPI: 27 y.o. female presents for her first follow-up visit since her initial evaluation in March 2016.  In the interim, she has decided Questran therapy caused uncomfortable constipation for her and ceased this medication although did control the loose stools.  She also decided not to complete the recommended EGD evaluation.  At present, she has recently undergone a change in her antidepressant medications, and feels that she is having daily left upper quadrant and/or lower abdominal pains that spike in intensity immediately postprandially, and are generally relieved with bowel movements.  Her normal bowel pattern currently is 1 bowel movement 4-10 times daily, generally after she eats anything.  There is no associated bright red blood per rectum or melena.  She also has nausea approximately every other day, and this nausea lasts for a couple of hours.  She notes that all her symptoms are increased with increased stress.  Despite the GI symptoms, the patient's weight has actually increased slightly over the past year and thus far, she has not had any benefit from Bentyl therapy which was recently ascribed for her.    I reviewed with the patient the results of her February 2016 lab data, all which was normal, and I also discussed the concept of irritable bowel syndrome, the linkage to anxiety/stress disorder, and possible need for probiotic therapy and/or improved control of her anxiety/depression symptoms.    Patient is accompanied by significant other.    Past Medical History:   Diagnosis Date    Anxiety     HSV-2 (herpes simplex virus 2) infection 2014       Review of Systems  Review of Systems   Constitutional: Positive for fatigue. Negative for appetite change, chills, diaphoresis, fever and unexpected weight change.   HENT: Negative for postnasal drip, trouble swallowing and voice change.    Eyes:  "Negative for visual disturbance.   Respiratory: Negative for cough, chest tightness, shortness of breath and wheezing.    Cardiovascular: Negative.    Gastrointestinal: Positive for abdominal distention, abdominal pain, diarrhea, nausea and vomiting. Negative for anal bleeding, blood in stool, constipation and rectal pain.   Endocrine: Negative for cold intolerance and polyuria.   Genitourinary: Negative for difficulty urinating, frequency, urgency and vaginal bleeding.   Musculoskeletal: Negative for arthralgias, back pain and gait problem.   Skin: Negative.         Multiple tattoos obtained over the years   Allergic/Immunologic: Negative for environmental allergies and food allergies.   Neurological: Negative for seizures, speech difficulty and headaches.   Hematological: Does not bruise/bleed easily.   Psychiatric/Behavioral: The patient is nervous/anxious.        Physical Examination  /70 (BP Location: Right arm, Patient Position: Sitting, BP Method: Automatic)  Pulse 86  Ht 5' 7" (1.702 m)  Wt 78.3 kg (172 lb 9.9 oz)  BMI 27.04 kg/m2  Wt Readings from Last 1 Encounters:   03/01/17 0823 78.3 kg (172 lb 9.9 oz)       Physical Exam   Constitutional: She is oriented to person, place, and time. Vital signs are normal. She appears well-developed and well-nourished.   HENT:   Head: Normocephalic and atraumatic.   Right Ear: External ear normal.   Left Ear: External ear normal.   Nose: Nose normal.   Mouth/Throat: Uvula is midline and oropharynx is clear and moist. No oropharyngeal exudate.   Eyes: Conjunctivae, EOM and lids are normal. Pupils are equal, round, and reactive to light. No scleral icterus.   Neck: Trachea normal. Neck supple. No JVD present. No tracheal tenderness and no muscular tenderness present. No tracheal deviation and no edema present. No thyromegaly present.   Cardiovascular: Normal rate, regular rhythm, normal heart sounds and normal pulses.  Exam reveals no S3 and no S4.    No murmur " heard.  Pulmonary/Chest: Effort normal and breath sounds normal. No stridor.   Abdominal: Soft. Normal appearance and bowel sounds are normal. She exhibits no distension, no pulsatile liver, no fluid wave, no abdominal bruit, no pulsatile midline mass and no mass. There is no hepatosplenomegaly. There is no tenderness. There is no rebound and no guarding. No hernia.   Mild to moderate obesity, nontender throughout examined area   Musculoskeletal: Normal range of motion. She exhibits no edema.   Neurological: She is alert and oriented to person, place, and time. She exhibits normal muscle tone. Coordination normal.   Skin: Skin is warm and dry. No petechiae and no rash noted. No cyanosis. Nails show no clubbing.   Psychiatric: She has a normal mood and affect. Her speech is normal and behavior is normal. Judgment normal. Cognition and memory are normal.   Nursing note and vitals reviewed.    Labs:   Complete Blood Count  Lab Results   Component Value Date    RBC 4.66 02/26/2016    HGB 14.1 02/26/2016    HCT 42.6 02/26/2016    MCV 91 02/26/2016    MCH 30.3 02/26/2016    MCHC 33.1 02/26/2016    RDW 13.1 02/26/2016     02/26/2016    MPV 8.8 (L) 02/26/2016    GRAN 4.9 02/26/2016    GRAN 60.7 02/26/2016    LYMPH 2.4 02/26/2016    LYMPH 29.7 02/26/2016    MONO 0.6 02/26/2016    MONO 7.2 02/26/2016    EOS 0.1 02/26/2016    BASO 0.03 02/26/2016    EOSINOPHIL 1.6 02/26/2016    BASOPHIL 0.4 02/26/2016    DIFFMETHOD Automated 02/26/2016       Comprehensive Metabolic Panel  Lab Results   Component Value Date    GLU 83 02/26/2016    BUN 15 02/26/2016    CREATININE 1.0 02/26/2016     02/26/2016    K 3.8 02/26/2016     02/26/2016    PROT 7.3 02/26/2016    ALBUMIN 3.9 02/26/2016    BILITOT 0.4 02/26/2016    AST 23 02/26/2016    ALKPHOS 84 02/26/2016    CO2 24 02/26/2016    ALT 22 02/26/2016    ANIONGAP 8 02/26/2016    EGFRNONAA >60 02/26/2016    ESTGFRAFRICA >60 02/26/2016       TSH  Lab Results   Component Value  Date    TSH 1.204 02/26/2016         Assessment:   Lower abdominal pain  Comments:  Increased pain immediately postprandially, with immediate relief by having a bowel movement  Orders:  -     US Abdomen Complete; Future; Expected date: 3/1/17  -     CBC auto differential; Future; Expected date: 3/1/17  -     Comprehensive metabolic panel; Future; Expected date: 3/1/17  -     TSH; Future; Expected date: 3/1/17  -     Case request GI: ESOPHAGOGASTRODUODENOSCOPY (EGD)    Depression, unspecified depression type    Generalized anxiety disorder    Irritable bowel syndrome with diarrhea  Comments:  Constipation generated with cholestyramine use; no weight loss; no bright red blood per rectum  Orders:  -     US Abdomen Complete; Future; Expected date: 3/1/17  -     CBC auto differential; Future; Expected date: 3/1/17  -     Comprehensive metabolic panel; Future; Expected date: 3/1/17  -     TSH; Future; Expected date: 3/1/17  -     Case request GI: ESOPHAGOGASTRODUODENOSCOPY (EGD)    Non-intractable vomiting with nausea, unspecified vomiting type    Plan:    No Follow-up on file.    Orders Placed This Encounter   Procedures    US Abdomen Complete    CBC auto differential    Comprehensive metabolic panel    TSH     -EGD to be schedule at [facility]  -Abdominal ultrasound  -Blood work to be done today\  -Trial of Protonix 40 mg every morning before meals  -Consideration of psychiatric/psychological counseling discussed with patient  -Recommended trial of probiotic therapy      Jono Alatorre MD, FACP, FACG, AGAF [facility]  [address], [address], [place] [address]  Phone: [phone] Fax: [phone]    [address], [address], [place] [address]  Phone: [phone] Fax: [phone]    - Portions of this note were dictated using voice recognition software and may contain dictation related errors in spelling / grammar / syntax not discovered on text review.

## 2017-03-09 NOTE — TRANSFER OF CARE
"Anesthesia Transfer of Care Note    Patient: [facility]    Procedure(s) Performed: Procedure(s) (LRB):  ESOPHAGOGASTRODUODENOSCOPY (EGD) (N/A)    Patient location: GI    Anesthesia Type: MAC    Transport from OR: Transported from OR on room air with adequate spontaneous ventilation    Post pain: adequate analgesia    Post assessment: no apparent anesthetic complications and tolerated procedure well    Post vital signs: stable    Level of consciousness: awake, alert and oriented    Nausea/Vomiting: no nausea/vomiting    Complications: none          Last vitals:   Visit Vitals    BP 99/68 (Patient Position: Lying)    Pulse 75    Temp 36.6 °C (97.8 °F) (Oral)    Resp 16    Ht 5' 7" (1.702 m)    Wt 77.1 kg (170 lb)    SpO2 100%    Breastfeeding No    BMI 26.63 kg/m2     "

## 2017-03-09 NOTE — ANESTHESIA PREPROCEDURE EVALUATION
03/09/2017  Stephanie Harding is a 27 y.o., female.    [facility] Anesthesia Evaluation    I have reviewed the Patient Summary Reports.    I have reviewed the Nursing Notes.   I have reviewed the Medications.     Review of Systems  Anesthesia Hx:  History of prior surgery of interest to airway management or planning: Denies Family Hx of Anesthesia complications.   Denies Personal Hx of Anesthesia complications.   Social:  Smoker, Alcohol Use    Hematology/Oncology:  Hematology Normal   Oncology Normal     EENT/Dental:EENT/Dental Normal   Cardiovascular:  Cardiovascular Normal Exercise tolerance: good     Pulmonary:   Sleep Apnea    Musculoskeletal:  Musculoskeletal Normal    Neurological:  Neurology Normal    Endocrine:  Endocrine Normal    Psych:   Psychiatric History (MEENU, Depression)          Physical Exam  General:  Well nourished    Airway/Jaw/Neck:  Airway Findings: Mouth Opening: Normal Tongue: Normal  General Airway Assessment: Adult  Mallampati: III  Improves to II with phonation.  TM Distance: Normal, at least 6 cm      Dental:  Dental Findings: In tact   Chest/Lungs:  Chest/Lungs Findings: Clear to auscultation     Heart/Vascular:  Heart Findings: Rate: Normal  Rhythm: Regular Rhythm  Sounds: Normal             Anesthesia Plan  Type of Anesthesia, risks & benefits discussed:  Anesthesia Type:  MAC  Patient's Preference:   Intra-op Monitoring Plan:   Intra-op Monitoring Plan Comments:   Post Op Pain Control Plan:   Post Op Pain Control Plan Comments:   Induction:   IV  Beta Blocker:  Patient is not currently on a Beta-Blocker (No further documentation required).       Informed Consent: Patient understands risks and agrees with Anesthesia plan.  Questions answered. Anesthesia consent signed with patient.  ASA Score: 2     Day of Surgery Review of History & Physical: I have interviewed and examined the patient.  I have reviewed the patient's H&P dated:    H&P update referred to the surgeon.         Ready For Surgery From Anesthesia Perspective.

## 2017-03-09 NOTE — DISCHARGE INSTRUCTIONS
Post EGD  Discharge Instructions:     [facility]  3/9/2017  Jono Ayala*    RESTRICTIONS ON ACTIVITY:  -DO NOT drive a car or operate machinery until the day after procedure.  -Following Day: Return to full activities including work.  -Diet: Eat and drink normally unless instructed otherwise.    TREATMENT FOR COMMON SIDE EFFECTS:  *Sore Throat - treat with throat lozenges, gargle with warm salt water.  *Mild abdominal pain & bloating- rest and take liquids only.    SYMPTOMS TO WATCH FOR AND REPORT TO YOUR PHYSICIAN:  1. Chills or fever occurring 24 hours after procedure.  2. Pain in chest.  3. SEVERE abdominal pain or bloating.  4. Rectal bleeding which could be maroon or black.    If you have any questions or problems, please call your Physician:    Jono Ayala*    Lab Results: [phone]    If a complication or emergency situation arises and you are unable to reach your Physician - GO TO THE EMERGENCY ROOM.

## 2017-03-09 NOTE — INTERVAL H&P NOTE
The patient has been examined and the H&P has been reviewed:    I concur with the findings and changes have been noted since the H&P was written: Lab data NML; abd U/S showed hepatic hemangiomata only; NO biliary issues, S/P cholecystectomy    Anesthesia/Surgery risks, benefits and alternative options discussed and understood by patient/family.          Active Hospital Problems    Diagnosis  POA    Nausea & vomiting [R11.2]  Yes      Resolved Hospital Problems    Diagnosis Date Resolved POA   No resolved problems to display.

## 2017-03-09 NOTE — IP AVS SNAPSHOT
[facility]  [address]  [place] [address]  Phone: [phone]           Patient Discharge Instructions     Our goal is to set you up for success. This packet includes information on your condition, medications, and your home care. It will help you to care for yourself so you don't get sicker and need to go back to the hospital.     Please ask your nurse if you have any questions.        There are many details to remember when preparing to leave the hospital. Here is what you will need to do:    1. Take your medicine. If you are prescribed medications, review your Medication List in the following pages. You may have new medications to  at the pharmacy and others that you'll need to stop taking. Review the instructions for how and when to take your medications. Talk with your doctor or nurses if you are unsure of what to do.     2. Go to your follow-up appointments. Specific follow-up information is listed in the following pages. Your may be contacted by a transition nurse or clinical provider about future appointments. Be sure we have all of the phone numbers to reach you, if needed. Please contact your provider's office if you are unable to make an appointment.     3. Watch for warning signs. Your doctor or nurse will give you detailed warning signs to watch for and when to call for assistance. These instructions may also include educational information about your condition. If you experience any of warning signs to your health, call your doctor.               ** Verify the list of medication(s) below is accurate and up to date. Carry this with you in case of emergency. If your medications have changed, please notify your healthcare provider.             Medication List      CONTINUE taking these medications        Additional Info                      busPIRone 7.5 MG tablet   Commonly known as:  BUSPAR   Quantity:  60 tablet   Refills:  2   Dose:  7.5 mg    Instructions:  Take 1  tablet (7.5 mg total) by mouth 2 (two) times daily.     Begin Date    AM    Noon    PM    Bedtime       clonazePAM 1 MG tablet   Commonly known as:  KLONOPIN   Quantity:  60 tablet   Refills:  2   Dose:  1 mg    Instructions:  Take 1 tablet (1 mg total) by mouth 2 (two) times daily as needed for Anxiety (insomnia).     Begin Date    AM    Noon    PM    Bedtime       dicyclomine 20 mg tablet   Commonly known as:  BENTYL   Quantity:  120 tablet   Refills:  0   Dose:  20 mg    Instructions:  Take 1 tablet (20 mg total) by mouth every 6 (six) hours.     Begin Date    AM    Noon    PM    Bedtime       pantoprazole 40 MG tablet   Commonly known as:  PROTONIX   Quantity:  30 tablet   Refills:  3   Dose:  40 mg    Instructions:  Take 1 tablet (40 mg total) by mouth once daily.     Begin Date    AM    Noon    PM    Bedtime                  Please bring to all follow up appointments:    1. A copy of your discharge instructions.  2. All medicines you are currently taking in their original bottles.  3. Identification and insurance card.    Please arrive 15 minutes ahead of scheduled appointment time.    Please call 24 hours in advance if you must reschedule your appointment and/or time.        Your Scheduled Appointments     Apr 05, 2017  4:00 PM CDT   Injection with INJECTION, [facility] - [facility] ([place])    [address]  5th [address] [facility], [address]  [place] [address] [phone]              Follow-up Information     Follow up with Aydin Wang MD.    Specialty:  Internal Medicine    Why:  As needed    Contact information:    [address]  [address]  [facility] [address] [phone]          Follow up with Jono Alatorre MD In 2 months.    Specialty:  Gastroenterology    Why:  Office will call with biopsy / pathology report    Contact information:    [address] [facility]  [address]  [facility] [address] [phone]          Discharge Instructions     Future Orders    Diet general     Questions:  "   Total calories:      Fat restriction, if any:      Protein restriction, if any:      Na restriction, if any:      Fluid restriction:      Additional restrictions:          Discharge Instructions       Post EGD  Discharge Instructions:     Stephanie Harding  3/9/2017  Jono Ayala*    RESTRICTIONS ON ACTIVITY:  -DO NOT drive a car or operate machinery until the day after procedure.  -Following Day: Return to full activities including work.  -Diet: Eat and drink normally unless instructed otherwise.    TREATMENT FOR COMMON SIDE EFFECTS:  *Sore Throat - treat with throat lozenges, gargle with warm salt water.  *Mild abdominal pain & bloating- rest and take liquids only.    SYMPTOMS TO WATCH FOR AND REPORT TO YOUR PHYSICIAN:  1. Chills or fever occurring 24 hours after procedure.  2. Pain in chest.  3. SEVERE abdominal pain or bloating.  4. Rectal bleeding which could be maroon or black.    If you have any questions or problems, please call your Physician:    Jono Ayala*    Lab Results: [phone]    If a complication or emergency situation arises and you are unable to reach your Physician - GO TO THE EMERGENCY ROOM.        Primary Diagnosis     Your primary diagnosis was:  Nausea And Vomiting      Admission Information     Date & Time Provider Department [facility]    3/9/2017 10:17 AM Jono Alatorre MD [facility] [id number]      Care Providers     Provider Role Specialty Primary office phone    Jono Alatorre MD Attending Provider Gastroenterology [phone]    Jono Alatorre MD Surgeon  Gastroenterology [phone]      Your Vitals Were     BP Pulse Temp Resp Height Weight    99/68 (Patient Position: Lying) 75 97.8 °F (36.6 °C) (Oral) 16 5' 7" (1.702 m) 77.1 kg (170 lb)    SpO2 BMI             100% 26.63 kg/m2         Recent Lab Values     No lab values to display.      Pending Labs     Order Current Status    Specimen to Pathology - " Surgery Collected (03/09/17 1340)      Allergies as of 3/9/2017     No Known Allergies      [facility]     [facility] Nurse Care Line - 24/7 Assistance  Unless otherwise directed by your provider, please contact [facility], our nurse care line that is available for 24/7 assistance.     Registered nurses in the [facility] provide clinical advisement, health education, appointment booking, and other advisory services.  Call for this free service at [phone].        Advance Directives     An advance directive is a document which, in the event you are no longer able to make decisions for yourself, tells your healthcare team what kind of treatment you do or do not want to receive, or who you would like to make those decisions for you.  If you do not currently have an advance directive, [facility] encourages you to create one.  For more information call:  [phone]), [phone] ([phone]),  or log on to [email or web address].        Language Assistance Services     ATTENTION: Language assistance services are available, free of charge. Please call [phone].      ATENCIÓN: Si habla español, tiene a dorantes disposición servicios gratuitos de asistencia lingüística. Llame al [phone].     CHÚ Ý: N?u b?n nói Ti?ng Vi?t, có các d?ch v? h? tr? ngôn ng? mi?n phí dành cho b?n. G?i s? [phone].         [facility] complies with applicable Federal civil rights laws and does not discriminate on the basis of race, color, national origin, age, disability, or sex.

## 2017-03-09 NOTE — ANESTHESIA POSTPROCEDURE EVALUATION
"Anesthesia Post Evaluation    Patient: Stephanie Harding    Procedure(s) Performed: Procedure(s) (LRB):  ESOPHAGOGASTRODUODENOSCOPY (EGD) (N/A)    Final Anesthesia Type: MAC  Patient location during evaluation: OPS  Patient participation: Yes- Able to Participate  Level of consciousness: awake and alert  Post-procedure vital signs: reviewed and stable  Pain management: adequate  Airway patency: patent  PONV status at discharge: No PONV  Anesthetic complications: no      Cardiovascular status: blood pressure returned to baseline  Respiratory status: unassisted, spontaneous ventilation and room air  Hydration status: euvolemic  Follow-up not needed.        Visit Vitals    BP 99/68 (Patient Position: Lying)    Pulse 75    Temp 36.6 °C (97.8 °F) (Oral)    Resp 16    Ht 5' 7" (1.702 m)    Wt 77.1 kg (170 lb)    SpO2 100%    Breastfeeding No    BMI 26.63 kg/m2       Pain/Pat Score: Pain Assessment Performed: Yes (3/9/2017 11:58 AM)  Presence of Pain: denies (3/9/2017 11:58 AM)  Pain Rating Prior to Med Admin: 0 (3/9/2017 11:58 AM)  Pain Rating Post Med Admin: 0 (3/9/2017 11:58 AM)      "

## 2017-03-09 NOTE — ANESTHESIA POSTPROCEDURE EVALUATION
"Anesthesia Post Evaluation    Patient: [facility]    Procedure(s) Performed: Procedure(s) (LRB):  ESOPHAGOGASTRODUODENOSCOPY (EGD) (N/A)    Final Anesthesia Type: MAC  Patient location during evaluation: GI PACU  Patient participation: Yes- Able to Participate  Level of consciousness: awake and alert and oriented  Post-procedure vital signs: reviewed and stable  Pain management: adequate  Airway patency: patent  PONV status at discharge: No PONV  Anesthetic complications: no      Cardiovascular status: blood pressure returned to baseline, hemodynamically stable and stable  Respiratory status: spontaneous ventilation, unassisted and room air  Hydration status: euvolemic  Follow-up not needed.        Visit Vitals    BP 99/68 (Patient Position: Lying)    Pulse 75    Temp 36.6 °C (97.8 °F) (Oral)    Resp 16    Ht 5' 7" (1.702 m)    Wt 77.1 kg (170 lb)    SpO2 100%    Breastfeeding No    BMI 26.63 kg/m2       Pain/Pat Score: Pain Assessment Performed: Yes (3/9/2017 11:58 AM)  Presence of Pain: denies (3/9/2017 11:58 AM)  Pain Rating Prior to Med Admin: 0 (3/9/2017 11:58 AM)  Pain Rating Post Med Admin: 0 (3/9/2017 11:58 AM)      "

## 2017-03-10 PROBLEM — R10.12 LEFT UPPER QUADRANT ABDOMINAL PAIN OF UNKNOWN ETIOLOGY: Status: ACTIVE | Noted: 2017-03-10

## 2017-03-27 ENCOUNTER — PATIENT MESSAGE (OUTPATIENT)
Dept: FAMILY MEDICINE | Facility: CLINIC | Age: 27
End: 2017-03-27

## 2017-03-27 ENCOUNTER — OFFICE VISIT (OUTPATIENT)
Dept: GASTROENTEROLOGY | Facility: CLINIC | Age: 27
End: 2017-03-27
Payer: COMMERCIAL

## 2017-03-27 VITALS
DIASTOLIC BLOOD PRESSURE: 76 MMHG | BODY MASS INDEX: 26.53 KG/M2 | WEIGHT: 169.06 LBS | SYSTOLIC BLOOD PRESSURE: 112 MMHG | RESPIRATION RATE: 18 BRPM | HEART RATE: 93 BPM | HEIGHT: 67 IN

## 2017-03-27 DIAGNOSIS — K52.9 CHRONIC DIARRHEA: Primary | ICD-10-CM

## 2017-03-27 DIAGNOSIS — R10.30 LOWER ABDOMINAL PAIN: ICD-10-CM

## 2017-03-27 DIAGNOSIS — R93.2 ABNORMAL LIVER ULTRASOUND: ICD-10-CM

## 2017-03-27 DIAGNOSIS — K29.50 CHRONIC GASTRITIS WITHOUT BLEEDING, UNSPECIFIED GASTRITIS TYPE: ICD-10-CM

## 2017-03-27 DIAGNOSIS — Z90.49 S/P CHOLECYSTECTOMY: ICD-10-CM

## 2017-03-27 DIAGNOSIS — Z86.59 HISTORY OF ANXIETY: ICD-10-CM

## 2017-03-27 DIAGNOSIS — R93.429 ABNORMAL ULTRASOUND OF KIDNEY: ICD-10-CM

## 2017-03-27 PROCEDURE — 1160F RVW MEDS BY RX/DR IN RCRD: CPT | Mod: S$GLB,,, | Performed by: NURSE PRACTITIONER

## 2017-03-27 PROCEDURE — 99999 PR PBB SHADOW E&M-EST. PATIENT-LVL IV: CPT | Mod: PBBFAC,,, | Performed by: NURSE PRACTITIONER

## 2017-03-27 PROCEDURE — 99214 OFFICE O/P EST MOD 30 MIN: CPT | Mod: S$GLB,,, | Performed by: NURSE PRACTITIONER

## 2017-03-27 NOTE — MR AVS SNAPSHOT
[facility]  [address] [facility] [address]  [place] [address]  Phone: [phone]                  Stephanie Harding   3/27/2017 8:00 AM   Office Visit    Description:  Female : 1990   Provider:  REHANA Nugent   Department:  [facility]           Reason for Visit     Other           Diagnoses this Visit        Comments    Chronic diarrhea    -  Primary     Abnormal liver ultrasound         Lower abdominal pain                To Do List           Future Appointments        Provider Department Dept Phone    2017 4:00 PM [facility] - [phone]      Goals (5 Years of Data)     None      Follow-Up and Disposition     Return in about 2 months (around 2017), or if symptoms worsen or fail to improve.      [facility]     [facility] Nurse Care [facility] -  Assistance  Registered nurses in the [facility] provide clinical advisement, health education, appointment booking, and other advisory services.  Call for this free service at [phone].             Medications           Message regarding Medications     Verify the changes and/or additions to your medication regime listed below are the same as discussed with your clinician today.  If any of these changes or additions are incorrect, please notify your healthcare provider.             Verify that the below list of medications is an accurate representation of the medications you are currently taking.  If none reported, the list may be blank. If incorrect, please contact your healthcare provider. Carry this list with you in case of emergency.           Current Medications     busPIRone (BUSPAR) 7.5 MG tablet Take 1 tablet (7.5 mg total) by mouth 2 (two) times daily.    clonazePAM (KLONOPIN) 1 MG tablet Take 1 tablet (1 mg total) by mouth 2 (two) times daily as needed for Anxiety (insomnia).    pantoprazole (PROTONIX) 40 MG tablet Take 1 tablet (40 mg total) by mouth once  "daily.           Clinical Reference Information           Your Vitals Were     BP Pulse Resp Height Weight Last Period    112/76 93 18 5' 7" (1.702 m) 76.7 kg (169 lb 1.5 oz) 03/23/2017    BMI                26.48 kg/m2          Blood Pressure          Most Recent Value    BP  112/76      Allergies as of 3/27/2017     No Known Allergies      Immunizations Administered on Date of Encounter - 3/27/2017     None      Orders Placed During Today's Visit     Future Labs/Procedures Expected by Expires    Adenovirus Antigen EIA, Stool  3/27/2017 5/26/2018    Clostridium difficile EIA  3/27/2017 3/28/2018    Giardia / Cryptosporidum, EIA  3/27/2017 5/26/2018    Occult blood x 1, stool  3/27/2017 3/27/2018    Rotavirus antigen, stool  3/27/2017 3/28/2018    Stool culture  3/27/2017 3/28/2018    Stool Exam-Ova,Cysts,Parasites  3/27/2017 3/27/2018    US Pelvis Complete Non OB  3/27/2017 3/27/2018    WBC, Stool  3/27/2017 3/27/2018    X-Ray Abdomen Flat And Erect  3/27/2017 3/27/2018    US Abdomen Limited  9/27/2017 3/27/2018      Instructions      Uncertain Causes of Diarrhea (Adult)    Diarrhea is when stools are loose and watery. This can be caused by:  · Viral infections  · Bacterial infections  · Food poisoning  · Parasites  · Irritable bowel syndrome (IBS)  · Inflammatory bowel diseases such as ulcerative colitis, Crohn's disease, and celiac disease  · Food intolerance, such as to lactose, the sugar found in milk and milk products  · Reaction to medicines like antibiotics, laxatives, cancer drugs, and antacids  Along with diarrhea, you may also have:  · Abdominal pain and cramping  · Nausea and vomiting  · Loss of bowel control  · Fever and chills  · Bloody stools  In some cases, antibiotics may help to treat diarrhea. You may have a stool sample test. This is done to see what is causing your diarrhea, and if antibiotics will help treat it. The results of a stool sample test may take up to 2 days. The healthcare provider " may not give you antibiotics until he or she has the stool test results.  Diarrhea can cause dehydration. This is the loss of too much water and other fluids from the body. When this occurs, body fluid must be replaced. This can be done with oral rehydration solutions. Oral rehydration solutions are available at drugstores and grocery stores without a prescription.  Home care  Follow all instructions given by your healthcare provider. Rest at home for the next 24 hours, or until you feel better. Avoid caffeine, tobacco, and alcohol. These can make diarrhea, cramping, and pain worse.  If taking medicines:  · Dont take over-the-counter diarrhea or nausea medicines unless your healthcare provider tells you to.  · You may use acetaminophen or NSAID medicines like ibuprofen or naproxen to reduce pain and fever. Dont use these if you have chronic liver or kidney disease, or ever had a stomach ulcer or gastrointestinal bleeding. Don't use NSAID medicines if you are already taking one for another condition (like arthritis) or are on daily aspirin therapy (such as for heart disease or after a stroke). Talk with your healthcare provider first.  · If antibiotics were prescribed, be sure you take them until they are finished. Dont stop taking them even when you feel better. Antibiotics must be taken as a full course.  To prevent the spread of illness:  · Remember that washing with soap and water and using alcohol-based  is the best way to prevent the spread of infection.  · Clean the toilet after each use.  · Wash your hands before eating.  · Wash your hands before and after preparing food. Keep in mind that people with diarrhea or vomiting should not prepare food for others.  · Wash your hands after using cutting boards, countertops, and knives that have been in contact with raw foods.  · Wash and then peel fruits and vegetables.  · Keep uncooked meats away from cooked and ready-to-eat foods.  · Use a food  thermometer when cooking. Cook poultry to at least 165°F (74°C). Cook ground meat (beef, veal, pork, lamb) to at least 160°F (71°C). Cook fresh beef, veal, lamb, and pork to at least 145°F (63°C).  · Dont eat raw or undercooked eggs (poached or danitza side up), poultry, meat, or unpasteurized milk and juices.  Food and drinks  The main goal while treating vomiting or diarrhea is to prevent dehydration. This is done by taking small amounts of liquids often.  · Keep in mind that liquids are more important than food right now.  · Drink only small amounts of liquids at a time.  · Dont force yourself to eat, especially if you are having cramping, vomiting, or diarrhea. Dont eat large amounts at a time, even if you are hungry.  · If you eat, avoid fatty, greasy, spicy, or fried foods.  · Dont eat dairy foods or drink milk if you have diarrhea. These can make diarrhea worse.  During the first 24 hours you can try:  · Oral rehydration solutions. Do not use sports drinks. They have too much sugar and not enough electrolytes.  · Soft drinks without caffeine  · Ginger ale  · Water (plain or flavored)  · Decaf tea or coffee  · Clear broth, consommé, or bouillon  · Gelatin, popsicles, or frozen fruit juice bars  The second 24 hours, if you are feeling better, you can add:  · Hot cereal, plain toast, bread, rolls, or crackers  · Plain noodles, rice, mashed potatoes, chicken noodle soup, or rice soup  · Unsweetened canned fruit (no pineapple)  · Bananas  As you recover:  · Limit fat intake to less than 15 grams per day. Dont eat margarine, butter, oils, mayonnaise, sauces, gravies, fried foods, peanut butter, meat, poultry, or fish.  · Limit fiber. Dont eat raw or cooked vegetables, fresh fruits except bananas, or bran cereals.  · Limit caffeine and chocolate.  · Limit dairy.  · Dont use spices or seasonings except salt.  · Go back to your normal diet over time, as you feel better and your symptoms improve.  · If the  symptoms come back, go back to a simple diet or clear liquids.  Follow-up care  Follow up with your healthcare provider, or as advised. If a stool sample was taken or cultures were done, call the healthcare provider for the results as instructed.  Call 911  Call 911 if you have any of these symptoms:  · Trouble breathing  · Confusion  · Extreme drowsiness or trouble walking  · Loss of consciousness  · Rapid heart rate  · Chest pain  · Stiff neck  · Seizure  When to seek medical advice  Call your healthcare provider right away if any of these occur:  · Abdominal pain that gets worse  · Constant lower right abdominal pain  · Continued vomiting and inability to keep liquids down  · Diarrhea more than 5 times a day  · Blood in vomit or stool  · Dark urine or no urine for 8 hours, dry mouth and tongue, tiredness, weakness, or dizziness  · Drowsiness  · New rash  · You dont get better in 2 to 3 days  · Fever of 100.4°F (38°C) or higher that doesnt get lower with medicine  Date Last Reviewed: 1/3/2016  © 2793-9801 [employer]. [address] [place] [address]. All rights reserved. This information is not intended as a substitute for professional medical care. Always follow your healthcare professional's instructions.             Smoking Cessation     If you would like to quit smoking:   You may be eligible for free services if you are a [place] resident and started smoking cigarettes before September 1, 1988.  Call the Smoking Cessation [employer] (S[employer]) toll free at (088) 594-2685 or (626) 945-9280.   Call 1-800-QUIT-NOW if you do not meet the above criteria.            Language Assistance Services     ATTENTION: Language assistance services are available, free of charge. Please call [phone].      ATENCIÓN: Si habla español, tiene a dorantes disposición servicios gratuitos de asistencia lingüística. Llame al [phone].     CHÚ Ý: N?u b?n nói Ti?ng Vi?t, có các d?ch v? h? tr? ngôn ng? mi?n phí  dành cho b?n. G?i s? [phone].         [facility] complies with applicable Federal civil rights laws and does not discriminate on the basis of race, color, national origin, age, disability, or sex.

## 2017-03-27 NOTE — PROGRESS NOTES
Subjective:       Patient ID: Stephanie Harding is a 27 y.o. female Body mass index is 26.48 kg/(m^2).    Chief Complaint: Other (2nd opinion about crohn's vs IBS)    This patient is new to me.  Established patient of Dr. Nam.    HPI Comments: Patient reports she has been seeing Dr. Nam since 2016. Denies prior colonoscopy. Reports had and EGD and ultrasound done recently and was diagnosed with chronic gastritis.    GI Problem   The primary symptoms include abdominal pain, nausea (occasional mild) and diarrhea. Primary symptoms do not include fever, weight loss (gaining weight recently), fatigue, vomiting, melena, hematochezia or dysuria. The illness began more than 7 days ago (since 2007, cholecystectomy in 2012). The problem has not changed since onset.  The abdominal pain began more than 2 days ago (since 2007). The abdominal pain has been unchanged (anytime she eats, lasts until she has a bowel movement) since its onset. Pain location: lower abdomen. The severity of the abdominal pain is 0/10 (currently). The abdominal pain is relieved by bowel movements (aggravated by needing to have a bowel movement).   The diarrhea began more than 1 week ago (since 2007). The diarrhea is watery. The diarrhea occurs 2 to 4 times per day (anytime she eats). Risk factors: new medication: buspar takes prn started 2-3 months, denies recent  antibiotic/hospitalization, foreign travel, suspect food intake, or ill contacts.   The illness is also significant for bloating. The illness does not include chills, anorexia, dysphagia, odynophagia or constipation. Associated symptoms comments: Treatment tried: Questran caused constipation, bentyl no relief. Significant associated medical issues include GERD (occasional heartburn; takes protonix 40 mg once daily PRN; told she has chronic gastritis) and irritable bowel syndrome. Associated medical issues do not include inflammatory bowel disease, gallstones, liver disease, PUD, bowel  resection or diverticulitis.     Review of Systems   Constitutional: Positive for appetite change (decreased). Negative for chills, fatigue, fever, unexpected weight change and weight loss (gaining weight recently).   HENT: Negative for sore throat and trouble swallowing.    Respiratory: Negative for cough, choking and shortness of breath.    Cardiovascular: Negative for chest pain.   Gastrointestinal: Positive for abdominal pain, bloating, diarrhea and nausea (occasional mild). Negative for anal bleeding, anorexia, blood in stool, constipation, dysphagia, hematochezia, melena, rectal pain and vomiting.   Genitourinary: Negative for difficulty urinating, dysuria, flank pain, frequency, pelvic pain and urgency.   Neurological: Negative for weakness.   Psychiatric/Behavioral: The patient is nervous/anxious (history of anxiety, seeing a provider for this).        Past Medical History:   Diagnosis Date    Anxiety     HSV-2 (herpes simplex virus 2) infection 2014    Irritable bowel syndrome     Smoker      Past Surgical History:   Procedure Laterality Date    CHOLECYSTECTOMY  2012    UPPER GASTROINTESTINAL ENDOSCOPY  03/09/2017    Dr. Nam    URETERAL REIMPLANTION       Family History   Problem Relation Age of Onset    Pancreatic cancer Paternal Grandfather     Colon cancer Neg Hx     Colon polyps Neg Hx     Crohn's disease Neg Hx     Ulcerative colitis Neg Hx      Wt Readings from Last 10 Encounters:   03/27/17 76.7 kg (169 lb 1.5 oz)   03/09/17 77.1 kg (170 lb)   03/01/17 78.3 kg (172 lb 9.9 oz)   01/11/17 78 kg (171 lb 15.3 oz)   10/10/16 75.7 kg (166 lb 14.2 oz)   08/05/16 72 kg (158 lb 11.7 oz)   03/04/16 69.6 kg (153 lb 7 oz)   02/26/16 70.4 kg (155 lb 3.3 oz)   12/14/15 72.3 kg (159 lb 6.3 oz)   10/06/15 71.2 kg (157 lb)     Lab Results   Component Value Date    WBC 7.51 03/08/2017    HGB 14.4 03/08/2017    HCT 44.1 03/08/2017    MCV 94 03/08/2017     03/08/2017     CMP  Sodium   Date  Value Ref Range Status   03/08/2017 140 136 - 145 mmol/L Final     Potassium   Date Value Ref Range Status   03/08/2017 4.5 3.5 - 5.1 mmol/L Final     Chloride   Date Value Ref Range Status   03/08/2017 108 95 - 110 mmol/L Final     CO2   Date Value Ref Range Status   03/08/2017 22 (L) 23 - 29 mmol/L Final     Glucose   Date Value Ref Range Status   03/08/2017 93 70 - 110 mg/dL Final     BUN, Bld   Date Value Ref Range Status   03/08/2017 14 6 - 20 mg/dL Final     Creatinine   Date Value Ref Range Status   03/08/2017 0.9 0.5 - 1.4 mg/dL Final     Calcium   Date Value Ref Range Status   03/08/2017 9.2 8.7 - 10.5 mg/dL Final     Total Protein   Date Value Ref Range Status   03/08/2017 7.4 6.0 - 8.4 g/dL Final     Albumin   Date Value Ref Range Status   03/08/2017 3.6 3.5 - 5.2 g/dL Final     Total Bilirubin   Date Value Ref Range Status   03/08/2017 0.6 0.1 - 1.0 mg/dL Final     Comment:     For infants and newborns, interpretation of results should be based  on gestational age, weight and in agreement with clinical  observations.  Premature Infant recommended reference ranges:  Up to 24 hours.............<8.0 mg/dL  Up to 48 hours............<12.0 mg/dL  3-5 days..................<15.0 mg/dL  6-29 days.................<15.0 mg/dL       Alkaline Phosphatase   Date Value Ref Range Status   03/08/2017 76 55 - 135 U/L Final     AST   Date Value Ref Range Status   03/08/2017 16 10 - 40 U/L Final     ALT   Date Value Ref Range Status   03/08/2017 12 10 - 44 U/L Final     Anion Gap   Date Value Ref Range Status   03/08/2017 10 8 - 16 mmol/L Final     eGFR if    Date Value Ref Range Status   03/08/2017 >60.0 >60 mL/min/1.73 m^2 Final     eGFR if non    Date Value Ref Range Status   03/08/2017 >60.0 >60 mL/min/1.73 m^2 Final     Comment:     Calculation used to obtain the estimated glomerular filtration  rate (eGFR) is the CKD-EPI equation. Since race is unknown   in our information system, the  "eGFR values for   -American and Non--American patients are given   for each creatinine result.       Lab Results   Component Value Date    TSH 1.535 03/08/2017     Reviewed prior medical records including radiology report of 3/8/17 abdominal ultrasound (repeat in 6 months), 2/26/16 celiac serology (negative)& endoscopy history (see surgical history).    3/9/17 EGD was reviewed and procedure report states:   " Impression:           - Normal examined duodenum.                        - Erythematous mucosa in the greater curvature of                         the gastric body and antrum. Biopsied.                        - Normal cardia, gastric fundus, lesser curvature                         of the stomach, incisura and pylorus.                        - Normal cricopharyngeus, middle third of                         esophagus, lower third of esophagus, lower                         esophageal sphincter and gastroesophageal junction.                        - Ectopic gastric mucosa in the upper third of the                         esophagus. Biopsied.                        - Five biopsies were obtained in the duodenal bulb                         and in the 3rd part of the duodenum.  Recommendation:       - Discharge patient to home (ambulatory).                        - Patient has a contact number available for                         emergencies. The signs and symptoms of potential                         delayed complications were discussed with the                         patient. Return to normal activities tomorrow.                         Written discharge instructions were provided to the                         patient.                        - Resume previous diet.                        - Continue present medications.                        - Await pathology results.                        - Pathology report will be called to you when                         available in 7 - 10 days. ".  Biopsy " "results:   "FINAL PATHOLOGIC DIAGNOSIS  1. SMALL BOWEL (BIOPSY):  -Small intestinal mucosa with mild vascular congestion  -Normal villus architecture  -No increase in intraepithelial lymphocytes or expansion of villi with plasma cells  -Negative for dysplasia or malignancy  2. GASTRIC BIOPSY:  -Oxyntic and antral mucosa with focal epithelial denudation, mild chronic inflammation, vascular congestion and  reactive/reparative change  -No Helicobacter organisms identified on H&E  -Negative for intestinal metaplasia, dysplasia or malignancy  3. PROXIMAL ESOPHAGUS (BIOPSY):  -Stratified squamous and gastric-type mucosa with mild chronic inflammation  -Negative for intestinal metaplasia, dysplasia or malignancy  -See note  Note: 3. 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  Correlate clinically."  Objective:      Physical Exam   Constitutional: She is oriented to person, place, and time. She appears well-developed and well-nourished. No distress.   HENT:   Mouth/Throat: Oropharynx is clear and moist and mucous membranes are normal. No oral lesions. No oropharyngeal exudate.   Eyes: Conjunctivae are normal. Pupils are equal, round, and reactive to light. No scleral icterus.   Neck: Neck supple. No tracheal deviation present.   Cardiovascular: Normal rate.    Pulmonary/Chest: Effort normal and breath sounds normal. No respiratory distress. She has no wheezes.   Abdominal: Soft. Normal appearance and bowel sounds are normal. She exhibits no distension, no abdominal bruit and no mass. There is no hepatosplenomegaly. There is tenderness (mild) in the periumbilical area. There is no rigidity, no rebound, no guarding, no tenderness at McBurney's point and negative Gonzalez's sign. No hernia.   Lymphadenopathy:     She has no cervical adenopathy.   Neurological: She is alert and oriented to person, place, and time.   Skin: Skin is warm and dry. No rash noted. She is not " diaphoretic. No erythema. No pallor.   Non-jaundiced   Psychiatric: She has a normal mood and affect. Her behavior is normal.   Nursing note and vitals reviewed.      Assessment:       1. Chronic diarrhea    2. Abnormal liver ultrasound    3. Lower abdominal pain    4. Chronic gastritis without bleeding, unspecified gastritis type    5. History of anxiety    6. S/P cholecystectomy    7. Abnormal ultrasound of kidney        Plan:       Chronic diarrhea & s/p cholecystectomy  -     Adenovirus Antigen EIA, Stool; Future; Expected date: 3/27/17  -     Giardia / Cryptosporidum, EIA; Future; Expected date: 3/27/17  -     Occult blood x 1, stool; Future; Expected date: 3/27/17  -     Rotavirus antigen, stool; Future; Expected date: 3/27/17  -     Stool Exam-Ova,Cysts,Parasites; Future; Expected date: 3/27/17  -     WBC, Stool; Future; Expected date: 3/27/17  -     Stool culture; Future; Expected date: 3/27/17  -     Clostridium difficile EIA; Future; Expected date: 3/27/17  -     X-Ray Abdomen Flat And Erect; Future; Expected date: 3/27/17  - discussed about possible IBS diagnosis with patient in depth, patient verbalized understanding  - recommended OTC probiotics, such as Align or Culturelle, as directed  - avoid lactose  - drink adequate water intake  -smaller, more frequent meals  -avoid trigger foods  - schedule Colonoscopy, discussed procedure with the patient, verbalized understanding    Abnormal liver ultrasound  -     US Abdomen Limited; Future; Expected date: 9/27/17    Lower abdominal pain  -     US Pelvis Complete Non OB; Future; Expected date: 3/27/17  -     X-Ray Abdomen Flat And Erect; Future; Expected date: 3/27/17  - possible ct scan pending results of testing and if symptoms persist  - schedule Colonoscopy, discussed procedure with the patient, verbalized understanding    Chronic gastritis without bleeding, unspecified gastritis type  - continued protonix 40 mg once daily as directed (not PRN), take in  the morning before breakfast, discussed about possible long term use of medication (prefer to use lowest effective dose or discontinuing if possible) and discussed the risks & benefits with taking a reflux medication long term, and to take OTC calcium and vitamin d supplements as directed (such as Citracal +D), pt verbalized understanding  -discussed about the different types of medications used to treat reflux and how to use them, antacids can be used PRN for breakthrough heartburn symptoms by reducing stomach acid that is already produced, H2 blockers (zantac) work by limiting the amount acid production, & PPI's work to block acid production and are taken daily, patient verbalized understanding.  -Educated patient on lifestyle modifications to help control/reduce reflux/abdominal pain including: avoid large meals, avoid eating within 2-3 hours of bedtime (avoid late night eating & lying down soon after eating), elevate head of bed if nocturnal symptoms are present, smoking cessation (if current smoker), & weight loss (if overweight).   -Educated to avoid known foods which trigger reflux symptoms & to minimize/avoid high-fat foods, chocolate, caffeine, citrus, alcohol, & tomato products.  -Advised to avoid/limit use of NSAID's, since they can cause GI upset, bleeding, and/or ulcers. If needed, take with food.    History of anxiety  - follow-up with provider who manages this condition for continued evaluation and management    Abnormal kidney ultrasound  - recommend follow-up with PCP for continued evaluation and management    Return in about 2 months (around 5/27/2017), or if symptoms worsen or fail to improve.    If no improvement in symptoms or symptoms worsen, call/follow-up at clinic or go to ER.

## 2017-03-27 NOTE — PATIENT INSTRUCTIONS

## 2017-03-28 ENCOUNTER — TELEPHONE (OUTPATIENT)
Dept: SLEEP MEDICINE | Facility: CLINIC | Age: 27
End: 2017-03-28

## 2017-03-28 NOTE — TELEPHONE ENCOUNTER
Please  inform the patient of sleep study results.  Although there was a suggestion of mild sleep apnea on her home study, she has not met sleep apnea criteria.  Since home sleep studies are less sensitive than in lab study and can miss some shallow breathing events will she be willing o do in lab study to clarify the picture?    Thanks!

## 2017-03-29 ENCOUNTER — TELEPHONE (OUTPATIENT)
Dept: SLEEP MEDICINE | Facility: CLINIC | Age: 27
End: 2017-03-29

## 2017-03-30 ENCOUNTER — DOCUMENTATION ONLY (OUTPATIENT)
Dept: ADMINISTRATIVE | Facility: HOSPITAL | Age: 27
End: 2017-03-30

## 2017-03-30 NOTE — PROGRESS NOTES
PRE-VISIT CHART REVIEW    Appointment Scheduled on 3/31/17    Department stratifications & guidelines reviewed:yes    Target Chronic Diagnosis: None    Chronic Diagnosis Intervention Due: no    Goals Updated:N/A    Health Maintenance Due   Topic Date Due    TETANUS VACCINE  02/13/2006    Influenza Vaccine  08/01/2016       Advanced Directives:   65 years of age or older?  No  Directive on file?  N\A                                      Pre-visit patient communication:  In Person    Studies or screenings scheduled pre-visit: no

## 2017-03-31 ENCOUNTER — HOSPITAL ENCOUNTER (OUTPATIENT)
Dept: RADIOLOGY | Facility: HOSPITAL | Age: 27
Discharge: HOME | End: 2017-03-31
Attending: NURSE PRACTITIONER
Payer: COMMERCIAL

## 2017-03-31 ENCOUNTER — OFFICE VISIT (OUTPATIENT)
Dept: FAMILY MEDICINE | Facility: CLINIC | Age: 27
End: 2017-03-31
Payer: COMMERCIAL

## 2017-03-31 VITALS
TEMPERATURE: 98 F | DIASTOLIC BLOOD PRESSURE: 70 MMHG | SYSTOLIC BLOOD PRESSURE: 104 MMHG | BODY MASS INDEX: 26.12 KG/M2 | RESPIRATION RATE: 12 BRPM | HEIGHT: 67 IN | HEART RATE: 72 BPM | WEIGHT: 166.44 LBS

## 2017-03-31 DIAGNOSIS — K52.9 CHRONIC DIARRHEA: ICD-10-CM

## 2017-03-31 DIAGNOSIS — F33.1 MAJOR DEPRESSIVE DISORDER, RECURRENT, MODERATE: Primary | ICD-10-CM

## 2017-03-31 DIAGNOSIS — F51.01 PRIMARY INSOMNIA: ICD-10-CM

## 2017-03-31 DIAGNOSIS — R10.30 LOWER ABDOMINAL PAIN: ICD-10-CM

## 2017-03-31 PROCEDURE — 76856 US EXAM PELVIC COMPLETE: CPT | Mod: 26,,, | Performed by: RADIOLOGY

## 2017-03-31 PROCEDURE — 76856 US EXAM PELVIC COMPLETE: CPT | Mod: TC,PO

## 2017-03-31 PROCEDURE — 99214 OFFICE O/P EST MOD 30 MIN: CPT | Mod: S$GLB,,, | Performed by: INTERNAL MEDICINE

## 2017-03-31 PROCEDURE — 1160F RVW MEDS BY RX/DR IN RCRD: CPT | Mod: S$GLB,,, | Performed by: INTERNAL MEDICINE

## 2017-03-31 PROCEDURE — 76830 TRANSVAGINAL US NON-OB: CPT | Mod: 26,,, | Performed by: RADIOLOGY

## 2017-03-31 RX ORDER — ESCITALOPRAM OXALATE 10 MG/1
10 TABLET ORAL DAILY
Qty: 30 TABLET | Refills: 11 | Status: SHIPPED | OUTPATIENT
Start: 2017-03-31 | End: 2018-04-12 | Stop reason: SDUPTHER

## 2017-03-31 RX ORDER — BUPROPION HYDROCHLORIDE 150 MG/1
150 TABLET ORAL DAILY
Qty: 30 TABLET | Refills: 11 | Status: SHIPPED | OUTPATIENT
Start: 2017-03-31 | End: 2018-04-12 | Stop reason: SDUPTHER

## 2017-03-31 NOTE — PROGRESS NOTES
"Subjective:       Patient ID: Stephanie Harding is a 27 y.o. female.    Medication List with Changes/Refills   New Medications    BUPROPION (WELLBUTRIN XL) 150 MG TB24 TABLET    Take 1 tablet (150 mg total) by mouth once daily.    ESCITALOPRAM OXALATE (LEXAPRO) 10 MG TABLET    Take 1 tablet (10 mg total) by mouth once daily.   Current Medications    PANTOPRAZOLE (PROTONIX) 40 MG TABLET    Take 1 tablet (40 mg total) by mouth once daily.   Discontinued Medications    BUSPIRONE (BUSPAR) 7.5 MG TABLET    Take 1 tablet (7.5 mg total) by mouth 2 (two) times daily.    CLONAZEPAM (KLONOPIN) 1 MG TABLET    Take 1 tablet (1 mg total) by mouth 2 (two) times daily as needed for Anxiety (insomnia).       Chief Complaint: Establish Care (colonoscopy sched.)  She is a new patient here today to establish care.  She moved from [place] in 2015 and now has recently moved to the [facility] from [facility].     She has depression dx in 2010. She was taking celexa and wellbutrin since this diagnosis.  She was starting to feel that these were no longer helping and was seen by a doctor 2 months ago.  Both of these medications were stopped because he thought the diagnosis was more consistent with anxiety. She was started on buspar 7.5 mg bid and klonopin at night.  She says over the last 2 months she has felt awful.   She is more depressed and moods are worsening.  She denies suicidal ideations.  She denies anxiety or panic attacks. She had to stop buspar because it made her feel "drunk" all the time and hazy and it made her fingers numb. Since stopping she had come out of the hazy but her depression has worsened.  She says her sleep on klonopin is better.      She has a long standing history of diarrhea with bloating after eating food or drinking.  She has had for many years.  She was recently seen by GI on both [facility] and recently on [facility].  She had stool h pylori negative and was started on pantoprazole for GERD. This did not improve " symptoms. She had an EGD on 3/9/17 that was normal.  An abdominal u/s that showed Ultrasound shows what likely to hepatic hemangioma in the right lobe, the largest of these being 9 mm and the other being 6 mm; these are benign lesions, which we generally simply followed by ultrasound; we can discuss further evaluation at this time with MRI scanning at her next visit,-No evidence of biliary duct dilation, with a common bile duct measuring 2 mm,-Benign kidney cyst present, with some cortical thinning, possibly indicative of chronic kidney disease.  She was then seen by GI on [facility] couple days ago and had u/s of pelvis that was normal and is scheduled for a colonoscopy.  She also had blood work for chronic diarrhea which is still pending.     She has a history of apnea witness by her partner in the past.  She had a sleep study done that showed no evidence of BIBI but due to clinical history recommended a in house sleep study and referral to sleep medicine for evaluation based on her past clinical observations.  Her AHI was 2.      Tdap---less than 10 years  Influenza vaccine---refuses  Pap---2016 nl  Labs done on 3/8/2017 nl TSH, nl CMP, nl CBC, neg celiac workup, neg h pylori.     Review of Systems   Constitutional: Negative for activity change, appetite change, fatigue, fever and unexpected weight change.   HENT: Negative for congestion, ear pain, hearing loss, rhinorrhea, sore throat and trouble swallowing.    Eyes: Negative for pain, discharge and visual disturbance.   Respiratory: Negative for cough, chest tightness, shortness of breath and wheezing.    Cardiovascular: Negative for chest pain, palpitations and leg swelling.   Gastrointestinal: Positive for diarrhea. Negative for abdominal pain, blood in stool, constipation, nausea and vomiting.   Endocrine: Negative for polydipsia and polyuria.   Genitourinary: Negative for difficulty urinating, dysuria, hematuria and menstrual problem.   Musculoskeletal:  "Negative for arthralgias, back pain, joint swelling and myalgias.   Skin: Negative for rash.   Neurological: Negative for dizziness, weakness, numbness and headaches.   Hematological: Does not bruise/bleed easily.   Psychiatric/Behavioral: Positive for dysphoric mood and sleep disturbance. Negative for suicidal ideas. The patient is nervous/anxious.        Objective:      Vitals:    03/31/17 1042   BP: 104/70   Pulse: 72   Resp: 12   Temp: 98.2 °F (36.8 °C)   TempSrc: Oral   Weight: 75.5 kg (166 lb 7.2 oz)   Height: 5' 7" (1.702 m)     Body mass index is 26.07 kg/(m^2).  Physical Exam    General appearance: No acute distress, cooperative  Eyes: PERRL, EOMI, conjunctiva clear  Ears: normal external ear and pinna, tm clear without drainage, canals clear  Nose: Normal mucosa without drainage  Throat: no exudates or erythema, tonsils not enlarged  Mouth: no sores or lesions, moist mucous membranes, good dentition  Neck: FROM, soft, supple, no thyromegaly, no bruits  Lymph: no anterior or posterior cervical adenopathy  Heart::  Regular rate and rhythm, no murmur  Lung: Clear to ascultation bilaterally, no wheezing, no rales, no rhonchi, no distress  Abdomen: Soft, nontender, no distention, no hepatosplenomegaly, bowel sounds normal, no guarding, no rebound, no peritoneal signs  Skin: no rashes, no lesions  Extremities: no edema, no cyanosis  Neuro: CN 2-12 intact, 5/5 muscle strength upper and lower extremity bilaterally, 2+ DTRs UE and LE bilaterally, normal gait, normal sensation  Peripheral pulses: 2+ pedal pulses bilaterally, good perfusion and color  Musculoskeletal: FROM, good strenth, no tenderness  Joint: normal appearance, no swelling, no warmth, no deformity in all joints    Assessment:       1. Major depressive disorder, recurrent, moderate    2. Primary insomnia    3. Chronic diarrhea        Plan:       Major depressive disorder, recurrent, moderate  Uncontrolled since stopping treatment. She is no longer " taking buspar. Will restart first wellbutrin at 150 mg qday x 1 week then add in lexapro 10 mg 1/2 tablet qday x 1 week then increase lexapro to 10 mg qday.  She will continue on lexapro 10 mg qday and wellbutrin 150 mg qday and f/u with me in 4 weeks to discuss her response to treatment.    -     buPROPion (WELLBUTRIN XL) 150 MG TB24 tablet; Take 1 tablet (150 mg total) by mouth once daily.  Dispense: 30 tablet; Refill: 11  -     escitalopram oxalate (LEXAPRO) 10 MG tablet; Take 1 tablet (10 mg total) by mouth once daily.  Dispense: 30 tablet; Refill: 11    Primary insomnia  Uncontrolled.  She needs to get off klonopin. This is not a good long term solution.  Will take 1/2 pill x 2 weeks then stop. I am hoping that once her above medication start to work her sleep will improve. If not will discuss at her appt in 4 weeks.     Chronic diarrhea  Unknown etiology. She is currently being evaluated by GI and scheduled for a colonoscopy.  She continues on pantoprazole per GI.  Stool studies pending.     Return in about 4 weeks (around 4/28/2017) for depression.

## 2017-04-05 ENCOUNTER — CLINICAL SUPPORT (OUTPATIENT)
Dept: OBSTETRICS AND GYNECOLOGY | Facility: CLINIC | Age: 27
End: 2017-04-05
Payer: COMMERCIAL

## 2017-04-05 DIAGNOSIS — Z30.42 SURVEILLANCE FOR DEPO-PROVERA CONTRACEPTION: Primary | ICD-10-CM

## 2017-04-05 PROCEDURE — 96372 THER/PROPH/DIAG INJ SC/IM: CPT | Mod: S$GLB,,, | Performed by: OBSTETRICS & GYNECOLOGY

## 2017-04-05 RX ADMIN — MEDROXYPROGESTERONE ACETATE 150 MG: 150 INJECTION, SUSPENSION INTRAMUSCULAR at 04:04

## 2017-04-05 NOTE — PROGRESS NOTES
Administered Depo Provera 150 mg/ml   1 ml IM inj in L upper quad gluteus  Patient Tolerated well.  Patient instructed to return on 6-27-17 for next injection.   Patient verbalized understanding.   Lot:[id number]  Exp: 11/2019

## 2017-04-12 ENCOUNTER — PATIENT MESSAGE (OUTPATIENT)
Dept: SLEEP MEDICINE | Facility: CLINIC | Age: 27
End: 2017-04-12

## 2017-04-12 ENCOUNTER — TELEPHONE (OUTPATIENT)
Dept: SLEEP MEDICINE | Facility: CLINIC | Age: 27
End: 2017-04-12

## 2017-04-12 ENCOUNTER — TELEPHONE (OUTPATIENT)
Dept: GASTROENTEROLOGY | Facility: CLINIC | Age: 27
End: 2017-04-12

## 2017-04-12 ENCOUNTER — PATIENT MESSAGE (OUTPATIENT)
Dept: ADMINISTRATIVE | Facility: OTHER | Age: 27
End: 2017-04-12

## 2017-04-12 DIAGNOSIS — G47.30 SLEEP APNEA, UNSPECIFIED TYPE: Primary | ICD-10-CM

## 2017-04-12 NOTE — TELEPHONE ENCOUNTER
----- Message from REHANA Nugent sent at 4/1/2017  6:55 PM CDT -----  Please call to inform & review the results with the patient- Pelvic ultrasound had normal findings. Continue with previous recommendations.  Thanks,  Nan SOTELOP-C

## 2017-04-25 ENCOUNTER — PATIENT MESSAGE (OUTPATIENT)
Dept: SLEEP MEDICINE | Facility: CLINIC | Age: 27
End: 2017-04-25

## 2017-04-25 ENCOUNTER — TELEPHONE (OUTPATIENT)
Dept: SLEEP MEDICINE | Facility: OTHER | Age: 27
End: 2017-04-25

## 2017-05-01 ENCOUNTER — TELEPHONE (OUTPATIENT)
Dept: SLEEP MEDICINE | Facility: OTHER | Age: 27
End: 2017-05-01

## 2017-05-01 NOTE — H&P
History & Physical - Short Stay  Gastroenterology      SUBJECTIVE:     Procedure: Gastroscopy    Chief Complaint/Indication for Procedure: Abdo pain.  Diarrhea    History of Present Illness:  Office Visit     3/27/2017  [place] - G[facility]    REHANA Nugent   Gastroenterology    Chronic diarrhea +6 more   Dx    Other ; Referred by Unknown Referring   Reason for visit    Progress Notes      Subjective:        Patient ID: Stephanie Harding is a 27 y.o. female Body mass index is 26.48 kg/(m^2).     Chief Complaint: Other (2nd opinion about crohn's vs IBS)     This patient is new to me.  Established patient of Dr. Nam.     HPI Comments: Patient reports she has been seeing Dr. Nam since 2016. Denies prior colonoscopy. Reports had and EGD and ultrasound done recently and was diagnosed with chronic gastritis.     GI Problem   The primary symptoms include abdominal pain, nausea (occasional mild) and diarrhea. Primary symptoms do not include fever, weight loss (gaining weight recently), fatigue, vomiting, melena, hematochezia or dysuria. The illness began more than 7 days ago (since 2007, cholecystectomy in 2012). The problem has not changed since onset.  The abdominal pain began more than 2 days ago (since 2007). The abdominal pain has been unchanged (anytime she eats, lasts until she has a bowel movement) since its onset. Pain location: lower abdomen. The severity of the abdominal pain is 0/10 (currently). The abdominal pain is relieved by bowel movements (aggravated by needing to have a bowel movement).   The diarrhea began more than 1 week ago (since 2007). The diarrhea is watery. The diarrhea occurs 2 to 4 times per day (anytime she eats). Risk factors: new medication: buspar takes prn started 2-3 months, denies recent antibiotic/hospitalization, foreign travel, suspect food intake, or ill contacts.   The illness is also significant for bloating. The illness does not include chills,  "anorexia, dysphagia, odynophagia or constipation. Associated symptoms comments: Treatment tried: Questran caused constipation, bentyl no relief. Significant associated medical issues include GERD (occasional heartburn; takes protonix 40 mg once daily PRN; told she has chronic gastritis) and irritable bowel syndrome. Associated medical issues do not include inflammatory bowel disease, gallstones, liver disease, PUD, bowel resection or diverticulitis.      Reviewed prior medical records including radiology report of 3/8/17 abdominal ultrasound (repeat in 6 months), 2/26/16 celiac serology (negative)& endoscopy history (see surgical history).     3/9/17 EGD was reviewed and procedure report states:  " Impression:           - Normal examined duodenum.                        - Erythematous mucosa in the greater curvature of                         the gastric body and antrum. Biopsied.                        - Normal cardia, gastric fundus, lesser curvature                         of the stomach, incisura and pylorus.                        - Normal cricopharyngeus, middle third of                         esophagus, lower third of esophagus, lower                         esophageal sphincter and gastroesophageal junction.                        - Ectopic gastric mucosa in the upper third of the                         esophagus. Biopsied.                        - Five biopsies were obtained in the duodenal bulb                         and in the 3rd part of the duodenum.  Recommendation:       - Discharge patient to home (ambulatory).                        - Patient has a contact number available for                         emergencies. The signs and symptoms of potential                         delayed complications were discussed with the                         patient. Return to normal activities tomorrow.                         Written discharge instructions were provided to the                        " " patient.                        - Resume previous diet.                        - Continue present medications.                        - Await pathology results.                        - Pathology report will be called to you when                         available in 7 - 10 days. ".  Biopsy results:   "FINAL PATHOLOGIC DIAGNOSIS  1. SMALL BOWEL (BIOPSY):  -Small intestinal mucosa with mild vascular congestion  -Normal villus architecture  -No increase in intraepithelial lymphocytes or expansion of villi with plasma cells  -Negative for dysplasia or malignancy  2. GASTRIC BIOPSY:  -Oxyntic and antral mucosa with focal epithelial denudation, mild chronic inflammation, vascular congestion and  reactive/reparative change  -No Helicobacter organisms identified on H&E  -Negative for intestinal metaplasia, dysplasia or malignancy  3. PROXIMAL ESOPHAGUS (BIOPSY):  -Stratified squamous and gastric-type mucosa with mild chronic inflammation  -Negative for intestinal metaplasia, dysplasia or malignancy  -See note  Note: 3. 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  Correlate clinically."      Assessment:       1. Chronic diarrhea    2. Abnormal liver ultrasound    3. Lower abdominal pain    4. Chronic gastritis without bleeding, unspecified gastritis type    5. History of anxiety    6. S/P cholecystectomy    7. Abnormal ultrasound of kidney        Plan:       Chronic diarrhea & s/p cholecystectomy  - Adenovirus Antigen EIA, Stool; Future; Expected date: 3/27/17  - Giardia / Cryptosporidum, EIA; Future; Expected date: 3/27/17  - Occult blood x 1, stool; Future; Expected date: 3/27/17  - Rotavirus antigen, stool; Future; Expected date: 3/27/17  - Stool Exam-Ova,Cysts,Parasites; Future; Expected date: 3/27/17  - WBC, Stool; Future; Expected date: 3/27/17  - Stool culture; Future; Expected date: 3/27/17  - Clostridium difficile EIA; Future; Expected date: 3/27/17  - " X-Ray Abdomen Flat And Erect; Future; Expected date: 3/27/17  - discussed about possible IBS diagnosis with patient in depth, patient verbalized understanding  - recommended OTC probiotics, such as Align or Culturelle, as directed  - avoid lactose  - drink adequate water intake  -smaller, more frequent meals  -avoid trigger foods  - schedule Colonoscopy, discussed procedure with the patient, verbalized understanding     Abnormal liver ultrasound  - US Abdomen Limited; Future; Expected date: 9/27/17     Lower abdominal pain  - US Pelvis Complete Non OB; Future; Expected date: 3/27/17  - X-Ray Abdomen Flat And Erect; Future; Expected date: 3/27/17  - possible ct scan pending results of testing and if symptoms persist  - schedule Colonoscopy, discussed procedure with the patient, verbalized understanding     Chronic gastritis without bleeding, unspecified gastritis type  - continued protonix 40 mg once daily as directed (not PRN), take in the morning before breakfast, discussed about possible long term use of medication (prefer to use lowest effective dose or discontinuing if possible) and discussed the risks & benefits with taking a reflux medication long term, and to take OTC calcium and vitamin d supplements as directed (such as Citracal +D), pt verbalized understanding  -discussed about the different types of medications used to treat reflux and how to use them, antacids can be used PRN for breakthrough heartburn symptoms by reducing stomach acid that is already produced, H2 blockers (zantac) work by limiting the amount acid production, & PPI's work to block acid production and are taken daily, patient verbalized understanding.  -Educated patient on lifestyle modifications to help control/reduce reflux/abdominal pain including: avoid large meals, avoid eating within 2-3 hours of bedtime (avoid late night eating & lying down soon after eating), elevate head of bed if nocturnal symptoms are present, smoking cessation  (if current smoker), & weight loss (if overweight).   -Educated to avoid known foods which trigger reflux symptoms & to minimize/avoid high-fat foods, chocolate, caffeine, citrus, alcohol, & tomato products.  -Advised to avoid/limit use of NSAID's, since they can cause GI upset, bleeding, and/or ulcers. If needed, take with food.     History of anxiety  - follow-up with provider who manages this condition for continued evaluation and management     Abnormal kidney ultrasound  - recommend follow-up with PCP for continued evaluation and management     Return in about 2 months (around 5/27/2017), or if symptoms worsen or fail to improve.    If no improvement in symptoms or symptoms worsen, call/follow-up at clinic or go to ER.              Facility-Administered Medications Prior to Admission   Medication    medroxyPROGESTERone (DEPO-PROVERA) injection 150 mg     PTA Medications   Medication Sig    buPROPion (WELLBUTRIN XL) 150 MG TB24 tablet Take 1 tablet (150 mg total) by mouth once daily.    escitalopram oxalate (LEXAPRO) 10 MG tablet Take 1 tablet (10 mg total) by mouth once daily.    pantoprazole (PROTONIX) 40 MG tablet Take 1 tablet (40 mg total) by mouth once daily.       Review of patient's allergies indicates:  No Known Allergies     Past Medical History:   Diagnosis Date    Anxiety     HSV-2 (herpes simplex virus 2) infection 2014    Irritable bowel syndrome     Smoker      Past Surgical History:   Procedure Laterality Date    CHOLECYSTECTOMY  2012    UPPER GASTROINTESTINAL ENDOSCOPY  03/09/2017    Dr. Nam    URETERAL REIMPLANTION       Family History   Problem Relation Age of Onset    Pancreatic cancer Paternal Grandfather     Irritable bowel syndrome Mother     Bipolar disorder Father     Lupus Maternal Grandmother     Colon cancer Neg Hx     Colon polyps Neg Hx     Crohn's disease Neg Hx     Ulcerative colitis Neg Hx      Social History   Substance Use Topics    Smoking status:  "Current Every Day Smoker     Packs/day: 0.50     Years: 11.00     Types: Cigarettes     Start date: 1/1/2007    Smokeless tobacco: Never Used    Alcohol use 4.2 oz/week     7 Cans of beer, 0 Standard drinks or equivalent per week         OBJECTIVE:     Vital Signs (Most Recent)  Temp: 97.7 °F (36.5 °C) (05/02/17 0735)  Pulse: 74 (05/02/17 0735)  Resp: 16 (05/02/17 0735)  BP: 102/67 (05/02/17 0735)  SpO2: 95 % (05/02/17 0735)    Physical Exam:  : Ht 5' 7" (1.702 m)    Wt 76.7 kg (169 lb 1.5 oz)    BMI 26.48 kg/m                                                       GENERAL:  Comfortable, in no acute distress.                                 HEENT EXAM:  Nonicteric.  No adenopathy.  Oropharynx is clear.               NECK:  Supple.                                                               LUNGS:  Clear.                                                               CARDIAC:  Regular rate and rhythm.  S1, S2.  No murmur.                      ABDOMEN:  Soft, positive bowel sounds, nontender.  No hepatosplenomegaly or masses.  No rebound or guarding.      EXTREMITIES:  No edema.     MENTAL STATUS:  Alert and oriented.    ASSESSMENT/PLAN:     Assessment: Abdo pain.  Diarrhea    Plan: Gastroscopy    Anesthesia Plan:   MAC / General Anaesthesia    ASA Grade: ASA 2 - Patient with mild systemic disease with no functional limitations    MALLAMPATI SCORE: I (soft palate, uvula, fauces, and tonsillar pillars visible)    "

## 2017-05-02 ENCOUNTER — ANESTHESIA (OUTPATIENT)
Dept: ENDOSCOPY | Facility: HOSPITAL | Age: 27
End: 2017-05-02
Payer: COMMERCIAL

## 2017-05-02 ENCOUNTER — SURGERY (OUTPATIENT)
Age: 27
End: 2017-05-02

## 2017-05-02 ENCOUNTER — HOSPITAL ENCOUNTER (OUTPATIENT)
Facility: HOSPITAL | Age: 27
Discharge: HOME | End: 2017-05-02
Attending: INTERNAL MEDICINE | Admitting: INTERNAL MEDICINE
Payer: COMMERCIAL

## 2017-05-02 ENCOUNTER — ANESTHESIA EVENT (OUTPATIENT)
Dept: ENDOSCOPY | Facility: HOSPITAL | Age: 27
End: 2017-05-02
Payer: COMMERCIAL

## 2017-05-02 VITALS
RESPIRATION RATE: 16 BRPM | SYSTOLIC BLOOD PRESSURE: 99 MMHG | HEIGHT: 67 IN | OXYGEN SATURATION: 99 % | DIASTOLIC BLOOD PRESSURE: 55 MMHG | BODY MASS INDEX: 23.7 KG/M2 | RESPIRATION RATE: 21 BRPM | WEIGHT: 151 LBS | HEART RATE: 61 BPM | TEMPERATURE: 97 F

## 2017-05-02 DIAGNOSIS — R19.7 DIARRHEA: ICD-10-CM

## 2017-05-02 LAB
B-HCG UR QL: NEGATIVE
CTP QC/QA: YES

## 2017-05-02 PROCEDURE — 87209 SMEAR COMPLEX STAIN: CPT

## 2017-05-02 PROCEDURE — 45385 COLONOSCOPY W/LESION REMOVAL: CPT | Mod: PO | Performed by: INTERNAL MEDICINE

## 2017-05-02 PROCEDURE — D9220A PRA ANESTHESIA: Mod: ANES,,, | Performed by: ANESTHESIOLOGY

## 2017-05-02 PROCEDURE — 87449 NOS EACH ORGANISM AG IA: CPT

## 2017-05-02 PROCEDURE — 25000003 PHARM REV CODE 250: Mod: PO | Performed by: NURSE ANESTHETIST, CERTIFIED REGISTERED

## 2017-05-02 PROCEDURE — 88305 TISSUE EXAM BY PATHOLOGIST: CPT | Performed by: PATHOLOGY

## 2017-05-02 PROCEDURE — 37000009 HC ANESTHESIA EA ADD 15 MINS: Mod: PO | Performed by: INTERNAL MEDICINE

## 2017-05-02 PROCEDURE — 45380 COLONOSCOPY AND BIOPSY: CPT | Mod: PO | Performed by: INTERNAL MEDICINE

## 2017-05-02 PROCEDURE — 88305 TISSUE EXAM BY PATHOLOGIST: CPT | Mod: 26,,, | Performed by: PATHOLOGY

## 2017-05-02 PROCEDURE — 45385 COLONOSCOPY W/LESION REMOVAL: CPT | Mod: ,,, | Performed by: INTERNAL MEDICINE

## 2017-05-02 PROCEDURE — 81025 URINE PREGNANCY TEST: CPT | Mod: PO | Performed by: INTERNAL MEDICINE

## 2017-05-02 PROCEDURE — 27201012 HC FORCEPS, HOT/COLD, DISP: Mod: PO | Performed by: INTERNAL MEDICINE

## 2017-05-02 PROCEDURE — 87045 FECES CULTURE AEROBIC BACT: CPT

## 2017-05-02 PROCEDURE — D9220A PRA ANESTHESIA: Mod: CRNA,,, | Performed by: NURSE ANESTHETIST, CERTIFIED REGISTERED

## 2017-05-02 PROCEDURE — 87046 STOOL CULTR AEROBIC BACT EA: CPT | Mod: 59

## 2017-05-02 PROCEDURE — 37000008 HC ANESTHESIA 1ST 15 MINUTES: Mod: PO | Performed by: INTERNAL MEDICINE

## 2017-05-02 PROCEDURE — 45380 COLONOSCOPY AND BIOPSY: CPT | Mod: 59,,, | Performed by: INTERNAL MEDICINE

## 2017-05-02 PROCEDURE — 27201089 HC SNARE, DISP (ANY): Mod: PO | Performed by: INTERNAL MEDICINE

## 2017-05-02 PROCEDURE — 25000003 PHARM REV CODE 250: Mod: PO | Performed by: INTERNAL MEDICINE

## 2017-05-02 PROCEDURE — 87427 SHIGA-LIKE TOXIN AG IA: CPT | Mod: 59

## 2017-05-02 PROCEDURE — 63600175 PHARM REV CODE 636 W HCPCS: Mod: PO | Performed by: NURSE ANESTHETIST, CERTIFIED REGISTERED

## 2017-05-02 RX ORDER — PROPOFOL 10 MG/ML
VIAL (ML) INTRAVENOUS
Status: DISCONTINUED | OUTPATIENT
Start: 2017-05-02 | End: 2017-05-02

## 2017-05-02 RX ORDER — DICYCLOMINE HYDROCHLORIDE 10 MG/1
10 CAPSULE ORAL
Qty: 120 CAPSULE | Refills: 11 | Status: SHIPPED | OUTPATIENT
Start: 2017-05-02 | End: 2017-05-02

## 2017-05-02 RX ORDER — SODIUM CHLORIDE, SODIUM LACTATE, POTASSIUM CHLORIDE, CALCIUM CHLORIDE 600; 310; 30; 20 MG/100ML; MG/100ML; MG/100ML; MG/100ML
INJECTION, SOLUTION INTRAVENOUS CONTINUOUS
Status: DISCONTINUED | OUTPATIENT
Start: 2017-05-02 | End: 2017-05-02 | Stop reason: HOSPADM

## 2017-05-02 RX ORDER — LIDOCAINE HCL/PF 100 MG/5ML
SYRINGE (ML) INTRAVENOUS
Status: DISCONTINUED | OUTPATIENT
Start: 2017-05-02 | End: 2017-05-02

## 2017-05-02 RX ORDER — DICYCLOMINE HYDROCHLORIDE 10 MG/1
10 CAPSULE ORAL
Qty: 120 CAPSULE | Refills: 11 | Status: SHIPPED | OUTPATIENT
Start: 2017-05-02 | End: 2017-09-20

## 2017-05-02 RX ADMIN — PROPOFOL 25 MG: 10 INJECTION, EMULSION INTRAVENOUS at 08:05

## 2017-05-02 RX ADMIN — LIDOCAINE HYDROCHLORIDE 100 MG: 20 INJECTION, SOLUTION INTRAVENOUS at 08:05

## 2017-05-02 RX ADMIN — PROPOFOL 150 MG: 10 INJECTION, EMULSION INTRAVENOUS at 08:05

## 2017-05-02 RX ADMIN — PROPOFOL 25 MG: 10 INJECTION, EMULSION INTRAVENOUS at 09:05

## 2017-05-02 RX ADMIN — PROPOFOL 50 MG: 10 INJECTION, EMULSION INTRAVENOUS at 08:05

## 2017-05-02 RX ADMIN — SODIUM CHLORIDE, SODIUM LACTATE, POTASSIUM CHLORIDE, AND CALCIUM CHLORIDE: .6; .31; .03; .02 INJECTION, SOLUTION INTRAVENOUS at 07:05

## 2017-05-02 NOTE — TRANSFER OF CARE
"Anesthesia Transfer of Care Note    Patient: [facility]    Procedure(s) Performed: Procedure(s) (LRB):  COLONOSCOPY (N/A)    Patient location: PACU    Anesthesia Type: general    Transport from OR: Transported from OR on room air with adequate spontaneous ventilation    Post pain: adequate analgesia    Post assessment: no apparent anesthetic complications    Post vital signs: stable    Level of consciousness: sedated    Nausea/Vomiting: no nausea/vomiting    Complications: none          Last vitals:   Visit Vitals    /67 (BP Location: Right arm, Patient Position: Lying, BP Method: Automatic)    Pulse 74    Temp 36.5 °C (97.7 °F) (Temporal)    Resp 16    Ht 5' 7" (1.702 m)    Wt 68.5 kg (151 lb)    SpO2 95%    Breastfeeding No    BMI 23.65 kg/m2     "

## 2017-05-02 NOTE — BRIEF OP NOTE
Discharge Note  Short Stay      SUMMARY     Admit Date: 5/2/2017    Attending Physician: Bib Haq Jr., MD     Discharge Physician: Bib Haq Jr., MD    Discharge Date: 5/2/2017 9:28 AM    Final Diagnosis: Chronic diarrhea [K52.9]  Lower abdominal pain [R10.30]  Impression:          - One benign appearing 1 mm polyp in the rectum,                        removed with a cold biopsy forceps. Resected and                        retrieved.                       - One 3 mm polyp in the distal transverse colon,                        removed with a cold snare. Resected and retrieved.                       - Non-bleeding internal hemorrhoids.                       - Mild colonic spasm consistent with irritable bowel                        syndrome.                       - The examination was otherwise normal.                       - The examined portion of the ileum was normal.                        Biopsied.                       - The rectum and recto-sigmoid colon are normal.                        Fluid aspiration performed.                       - The entire examined colon is normal. Biopsied.  Recommendation:      - Discharge patient to home.                       - Await pathology results.                       - High fiber diet.                       - Use fiber, for example Citrucel, Fibercon, Konsyl                        or Metamucil.                       - Take a PROBIOTIC, such as ALIGN or CULTURELLE or                        LEO-Q (all non-prescription), every day for a                        month.                       - Use Bentyl (dicyclomine) 10-20 mg PO QID 30 min AC.                       - Next consider (re)trial of Questran at 1 scoop (4                        grams) PO AC.                       - Call the G.I. clinic in 2 weeks for reports (if                        you haven't heard from us sooner) [phone].                       - Return to GI clinic in 6-8 weeks.                        - Pending path reports, repeat colonoscopy at age 48                        for screening purposes.                       - Continue present medications.  Disposition: HOME OR SELF CARE    Patient Instructions:   Current Discharge Medication List      START taking these medications    Details   dicyclomine (BENTYL) 10 MG capsule Take 1 capsule (10 mg total) by mouth 4 (four) times daily before meals and nightly. 1 or 2 caps q ac & hs, prn to ease spasms/diarrhea.  Qty: 120 capsule, Refills: 11         CONTINUE these medications which have NOT CHANGED    Details   buPROPion (WELLBUTRIN XL) 150 MG TB24 tablet Take 1 tablet (150 mg total) by mouth once daily.  Qty: 30 tablet, Refills: 11    Associated Diagnoses: Major depressive disorder, recurrent, moderate      escitalopram oxalate (LEXAPRO) 10 MG tablet Take 1 tablet (10 mg total) by mouth once daily.  Qty: 30 tablet, Refills: 11    Associated Diagnoses: Major depressive disorder, recurrent, moderate      pantoprazole (PROTONIX) 40 MG tablet Take 1 tablet (40 mg total) by mouth once daily.  Qty: 30 tablet, Refills: 3             Discharge Procedure Orders (must include Diet, Follow-up, Activity)    Follow Up:  Follow up with PCP as per your routine.  Please follow a high fiber diet.  Activity as tolerated.    No driving day of procedure.    PROBIOTICS:  Now that your colon is so cleaned out, now is a good time for a round of PROBIOTICS.  Take a Probiotic product such as Align or Culturelle or Mamta-Q, every day for a month.                  (The products listed are non-prescription, but you may need to ask the pharmacist for their location.)  Repeat this at least 5-6 times a year.

## 2017-05-02 NOTE — DISCHARGE INSTRUCTIONS
Procedural Sedation (Adult)  You have been given medicine by vein to make you sleep during your surgery. This may have included both a pain medicine and sleeping medicine. Most of the effects have worn off. But you may still have some drowsiness for the next 6 to 8 hours.  Home care  Follow these guidelines when you get home:  · For the next 8 hours, you should be watched by a responsible adult. This person should make sure your condition is not getting worse.  · Don't take any medicine by mouth for pain or for sleep during the next 4 hours. These might react with the medicines you were given in the hospital. This could cause a much stronger response than usual.  · Don't drink any alcohol for the next 24 hours.  · Don't drive, operate dangerous machinery, or make important business or personal decisions during the next 24 hours.  Follow-up care  Follow up with your healthcare provider if you are not alert and back to your usual level of activity within 12 hours.  When to seek medical advice  Call your healthcare provider right away if any of these occur:  · Drowsiness gets worse  · Weakness or dizziness gets worse  · Repeated vomiting  · You cannot be awakened   Date Last Reviewed: 10/18/2016  © 3017-7802 [employer]. [address] [place] [address]. All rights reserved. This information is not intended as a substitute for professional medical care. Always follow your healthcare professional's instructions.      PROBIOTICS:  Now that your colon is so cleaned out, now is a good time for a round of PROBIOTICS.  Take a Probiotic product such as Align or Culturelle or Mamta-Q, every day for a month.                  (The products listed are non-prescription, but you may need to ask the pharmacist for their location.)  Repeat this at least 5-6 times a year.        High-Fiber Diet  Fiber is in fruits, vegetables, cereals, and grains. Fiber passes through your body undigested. A high-fiber diet  helps food move through your intestinal tract. The added bulk is helpful in preventing constipation. In people with diverticulosis, fiber helps clean out the pouches along the colon wall. It also prevents new pouches from forming. A high-fiber diet reduces the risk of colon cancer. It also lowers blood cholesterol and prevents high blood sugar in people with diabetes.    The fiber-rich foods listed below should be part of your diet. If you are not used to high-fiber foods, start with 1 or 2 foods from this list. Every 3 to 4 days add a new one to your diet. Do this until you are eating 4 high-fiber foods per day. This should give you 20 to 35 grams of fiber a day. It is also important to drink a lot of water when you are on this diet. You should have 6 to 8 glasses of water a day. Water makes the fiber swell and increases the benefit.  Foods high in dietary fiber  The following foods are high in dietary fiber:  · Breads. Breads made with 100% whole-wheat flour; allyn, wheat, or rye crackers; whole-grain tortillas, bran muffins.  · Cereals. Whole-grain and bran cereals with bran (shredded wheat, wheat flakes, raisin bran, corn bran); oatmeal, rolled oats, granola, and brown rice.  · Fruits. Fresh fruits and their edible skins (pears, prunes, raisins, berries, apples, and apricots); bananas, citrus fruit, mangoes, pineapple; and prune juice.  · Nuts. Any nuts and seeds.  · Vegetables. Best served raw or lightly cooked. All types, especially: green peas, celery, eggplant, potatoes, spinach, broccoli, [place] sprouts, winter squash, carrots, cauliflower, soybeans, lentils, and fresh and dried beans of all kinds.  · Other. Popcorn, any spices.  Date Last Reviewed: 8/1/2016  © 6090-3994 [employer]. [address] [place] [address], [place] [address]. All rights reserved. This information is not intended as a substitute for professional medical care. Always follow your healthcare professional's instructions.

## 2017-05-02 NOTE — ANESTHESIA PREPROCEDURE EVALUATION
05/02/2017  Stephanie Harding is a 27 y.o., female.    Anesthesia Evaluation    I have reviewed the Patient Summary Reports.    I have reviewed the Nursing Notes.   I have reviewed the Medications.     Review of Systems  Anesthesia Hx:  Denies Family Hx of Anesthesia complications.   Denies Personal Hx of Anesthesia complications.   Social:  Smoker, Alcohol Use    Hematology/Oncology:  Hematology Normal   Oncology Normal     EENT/Dental:EENT/Dental Normal   Cardiovascular:  Cardiovascular Normal Exercise tolerance: good     Pulmonary:   Sleep Apnea    Musculoskeletal:  Musculoskeletal Normal    Neurological:  Neurology Normal    Endocrine:  Endocrine Normal    Psych:   Psychiatric history: MEENU, Depression. anxiety depression          Physical Exam  General:  Well nourished    Airway/Jaw/Neck:  Airway Findings: Mouth Opening: Normal Tongue: Normal  General Airway Assessment: Adult  Mallampati: III  Improves to II with phonation.  TM Distance: Normal, at least 6 cm      Dental:  Dental Findings: In tact   Chest/Lungs:  Chest/Lungs Findings: Clear to auscultation, Normal Respiratory Rate     Heart/Vascular:  Heart Findings: Rate: Normal  Rhythm: Regular Rhythm  Sounds: Normal             Anesthesia Plan  Type of Anesthesia, risks & benefits discussed:  Anesthesia Type:  general  Patient's Preference:   Intra-op Monitoring Plan:   Intra-op Monitoring Plan Comments:   Post Op Pain Control Plan:   Post Op Pain Control Plan Comments:   Induction:   IV  Beta Blocker:  Patient is not currently on a Beta-Blocker (No further documentation required).       Informed Consent: Patient understands risks and agrees with Anesthesia plan.  Questions answered. Anesthesia consent signed with patient.  ASA Score: 2     Day of Surgery Review of History & Physical: I have interviewed and examined the patient. I have reviewed the  patient's H&P dated:  There are no significant changes.  H&P update referred to the surgeon.         Ready For Surgery From Anesthesia Perspective.

## 2017-05-02 NOTE — ANESTHESIA POSTPROCEDURE EVALUATION
"Anesthesia Post Evaluation    Patient: [facility]    Procedure(s) Performed: Procedure(s) (LRB):  COLONOSCOPY (N/A)    Final Anesthesia Type: general  Patient location during evaluation: PACU  Patient participation: Yes- Able to Participate  Level of consciousness: awake and alert and oriented  Post-procedure vital signs: reviewed and stable  Pain management: adequate  Airway patency: patent  PONV status at discharge: No PONV  Anesthetic complications: no      Cardiovascular status: hemodynamically stable  Respiratory status: unassisted, spontaneous ventilation and room air  Hydration status: euvolemic  Follow-up not needed.        Visit Vitals    BP (!) 99/55 (BP Location: Left arm, Patient Position: Lying, BP Method: Automatic)    Pulse 61    Temp 36.2 °C (97.2 °F) (Skin)    Resp 16    Ht 5' 7" (1.702 m)    Wt 68.5 kg (151 lb)    SpO2 99%    Breastfeeding No    BMI 23.65 kg/m2       Pain/Pat Score: Pain Assessment Performed: Yes (5/2/2017  9:32 AM)  Presence of Pain: denies (5/2/2017  9:32 AM)  Pat Score: 10 (5/2/2017  9:32 AM)      "

## 2017-05-02 NOTE — IP AVS SNAPSHOT
[facility]  [address] [facility]  [place] [address]  Phone: [phone]           Patient Discharge Instructions   Our goal is to set you up for success. This packet includes information on your condition, medications, and your home care.  It will help you care for yourself to prevent having to return to the hospital.     Please ask your nurse if you have any questions.      There are many details to remember when preparing to leave the hospital. Here is what you will need to do:    1. Take your medicine. If you are prescribed medications, review your Medication List on the following pages. You may have new medications to  at the pharmacy and others that you'll need to stop taking. Review the instructions for how and when to take your medications. Talk with your doctor or nurses if you are unsure of what to do.     2. Go to your follow-up appointments. Specific follow-up information is listed in the following pages. Your may be contacted by a nurse or clinical provider about future appointments. Be sure we have all of the phone numbers to reach you. Please contact your provider's office if you are unable to make an appointment.     3. Watch for warning signs. Your doctor or nurse will give you detailed warning signs to watch for and when to call for assistance. These instructions may also include educational information about your condition. If you experience any of warning signs to your health, call your doctor.           [facility]  Unless otherwise directed by your provider, please   contact [facility], our nurse care line   that is available for 24/7 assistance.     [phone] (toll-free)     Registered nurses in the [facility]   provide: appointment scheduling, clinical advisement, health education, and other advisory services.                  ** Verify the list of medication(s) below is accurate and up to date. Carry this with you in case of  emergency. If your medications have changed, please notify your healthcare provider.             Medication List      START taking these medications        Additional Info                      dicyclomine 10 MG capsule   Commonly known as:  BENTYL   Quantity:  120 capsule   Refills:  11   Dose:  10 mg    Instructions:  Take 1 capsule (10 mg total) by mouth 4 (four) times daily before meals and nightly. 1 or 2 caps q ac & hs, prn to ease spasms/diarrhea.     Begin Date    AM    Noon    PM    Bedtime         CONTINUE taking these medications        Additional Info                      buPROPion 150 MG TB24 tablet   Commonly known as:  WELLBUTRIN XL   Quantity:  30 tablet   Refills:  11   Dose:  150 mg    Instructions:  Take 1 tablet (150 mg total) by mouth once daily.     Begin Date    AM    Noon    PM    Bedtime       escitalopram oxalate 10 MG tablet   Commonly known as:  LEXAPRO   Quantity:  30 tablet   Refills:  11   Dose:  10 mg    Instructions:  Take 1 tablet (10 mg total) by mouth once daily.     Begin Date    AM    Noon    PM    Bedtime         ASK your doctor about these medications        Additional Info                      pantoprazole 40 MG tablet   Commonly known as:  PROTONIX   Quantity:  30 tablet   Refills:  3   Dose:  40 mg    Instructions:  Take 1 tablet (40 mg total) by mouth once daily.     Begin Date    AM    Noon    PM    Bedtime            Where to Get Your Medications      You can get these medications from any pharmacy     Bring a paper prescription for each of these medications     dicyclomine 10 MG capsule                  Please bring to all follow up appointments:    1. A copy of your discharge instructions.  2. All medicines you are currently taking in their original bottles.  3. Identification and insurance card.    Please arrive 15 minutes ahead of scheduled appointment time.    Please call 24 hours in advance if you must reschedule your appointment and/or time.        Your Scheduled  Appointments     Jun 28, 2017  4:00 PM CDT   Injection with INJECTION, [facility] - [facility] ([facility])    [address], [address]  [address] [place] [address] [phone]                Discharge Instructions     Future Orders    Activity as tolerated     Comments:    No driving for 24 hours.        Discharge Instructions         Procedural Sedation (Adult)  You have been given medicine by vein to make you sleep during your surgery. This may have included both a pain medicine and sleeping medicine. Most of the effects have worn off. But you may still have some drowsiness for the next 6 to 8 hours.  Home care  Follow these guidelines when you get home:  · For the next 8 hours, you should be watched by a responsible adult. This person should make sure your condition is not getting worse.  · Don't take any medicine by mouth for pain or for sleep during the next 4 hours. These might react with the medicines you were given in the hospital. This could cause a much stronger response than usual.  · Don't drink any alcohol for the next 24 hours.  · Don't drive, operate dangerous machinery, or make important business or personal decisions during the next 24 hours.  Follow-up care  Follow up with your healthcare provider if you are not alert and back to your usual level of activity within 12 hours.  When to seek medical advice  Call your healthcare provider right away if any of these occur:  · Drowsiness gets worse  · Weakness or dizziness gets worse  · Repeated vomiting  · You cannot be awakened   Date Last Reviewed: 10/18/2016  © 2046-3058 [employer]. [address] [place] [address], [place] [address]. All rights reserved. This information is not intended as a substitute for professional medical care. Always follow your healthcare professional's instructions.      PROBIOTICS:  Now that your colon is so cleaned out, now is a good time for a round of PROBIOTICS.  Take a Probiotic product  such as Align or Culturelle or Mamta-Q, every day for a month.                  (The products listed are non-prescription, but you may need to ask the pharmacist for their location.)  Repeat this at least 5-6 times a year.        High-Fiber Diet  Fiber is in fruits, vegetables, cereals, and grains. Fiber passes through your body undigested. A high-fiber diet helps food move through your intestinal tract. The added bulk is helpful in preventing constipation. In people with diverticulosis, fiber helps clean out the pouches along the colon wall. It also prevents new pouches from forming. A high-fiber diet reduces the risk of colon cancer. It also lowers blood cholesterol and prevents high blood sugar in people with diabetes.    The fiber-rich foods listed below should be part of your diet. If you are not used to high-fiber foods, start with 1 or 2 foods from this list. Every 3 to 4 days add a new one to your diet. Do this until you are eating 4 high-fiber foods per day. This should give you 20 to 35 grams of fiber a day. It is also important to drink a lot of water when you are on this diet. You should have 6 to 8 glasses of water a day. Water makes the fiber swell and increases the benefit.  Foods high in dietary fiber  The following foods are high in dietary fiber:  · Breads. Breads made with 100% whole-wheat flour; allyn, wheat, or rye crackers; whole-grain tortillas, bran muffins.  · Cereals. Whole-grain and bran cereals with bran (shredded wheat, wheat flakes, raisin bran, corn bran); oatmeal, rolled oats, granola, and brown rice.  · Fruits. Fresh fruits and their edible skins (pears, prunes, raisins, berries, apples, and apricots); bananas, citrus fruit, mangoes, pineapple; and prune juice.  · Nuts. Any nuts and seeds.  · Vegetables. Best served raw or lightly cooked. All types, especially: green peas, celery, eggplant, potatoes, spinach, broccoli, [place] sprouts, winter squash, carrots, cauliflower, soybeans,  "lentils, and fresh and dried beans of all kinds.  · Other. Popcorn, any spices.  Date Last Reviewed: 8/1/2016 © 2000-2016 [employer]. [address] [place] [address], [place] [address]. All rights reserved. This information is not intended as a substitute for professional medical care. Always follow your healthcare professional's instructions.            Primary Diagnosis     Your primary diagnosis was:  Diarrhea      Admission Information     Date & Time Provider Department CSN    5/2/2017  7:10 AM Bib Haq Jr., MD [facility] [id number]      Care Providers     Provider Role Specialty Primary office phone    Bib Haq Jr., MD Attending Provider Gastroenterology [phone]    Bib Haq Jr., MD Surgeon  Gastroenterology [phone]      Your Vitals Were     BP Pulse Temp Resp Height Weight    99/55 (BP Location: Left arm, Patient Position: Lying, BP Method: Automatic) 61 97.2 °F (36.2 °C) (Skin) 16 5' 7" (1.702 m) 68.5 kg (151 lb)    SpO2 BMI             99% 23.65 kg/m2         Recent Lab Values     No lab values to display.      Pending Labs     Order Current Status    Specimen to Pathology - Surgery Collected (05/02/17 0905)    Clostridium difficile EIA In process    E. coli 0157 antigen In process    Stool Exam-Ova,Cysts,Parasites In process    Stool culture In process      Allergies as of 5/2/2017     No Known Allergies      Advance Directives     An advance directive is a document which, in the event you are no longer able to make decisions for yourself, tells your healthcare team what kind of treatment you do or do not want to receive, or who you would like to make those decisions for you.  If you do not currently have an advance directive, [facility] encourages you to create one.  For more information call:  [phone] ([phone]), [phone] ([phone]),  or log on to [email or web address].        Language Assistance Services     ATTENTION: Language " assistance services are available, free of charge. Please call [phone].      ATENCIÓN: Si habla pierre, tiene a dorantes disposición servicios gratuitos de asistencia lingüística. Llame al [phone].     CHÚ Ý: N?u b?n nói Ti?ng Vi?t, có các d?ch v? h? tr? ngôn ng? mi?n phí dành cho b?n. G?i s? [phone].         [facility] complies with applicable Federal civil rights laws and does not discriminate on the basis of race, color, national origin, age, disability, or sex.

## 2017-05-03 LAB
C DIFF GDH STL QL: NEGATIVE
C DIFF TOX A+B STL QL IA: NEGATIVE
O+P STL TRI STN: NORMAL

## 2017-05-06 LAB
BACTERIA STL CULT: NORMAL
BACTERIA STL CULT: NORMAL

## 2017-05-17 ENCOUNTER — PATIENT MESSAGE (OUTPATIENT)
Dept: FAMILY MEDICINE | Facility: CLINIC | Age: 27
End: 2017-05-17

## 2017-06-02 ENCOUNTER — PATIENT MESSAGE (OUTPATIENT)
Dept: FAMILY MEDICINE | Facility: CLINIC | Age: 27
End: 2017-06-02

## 2017-06-02 RX ORDER — METHYLPREDNISOLONE 4 MG/1
TABLET ORAL
Qty: 1 PACKAGE | Refills: 0 | Status: SHIPPED | OUTPATIENT
Start: 2017-06-02 | End: 2017-06-23

## 2017-06-02 NOTE — TELEPHONE ENCOUNTER
----- Message from Alyx Johnson sent at 6/2/2017  2:53 PM CDT -----  Contact: Self/[phone]  Patient is returning your call.  Please advise

## 2017-06-28 ENCOUNTER — CLINICAL SUPPORT (OUTPATIENT)
Dept: OBSTETRICS AND GYNECOLOGY | Facility: CLINIC | Age: 27
End: 2017-06-28
Payer: COMMERCIAL

## 2017-06-28 ENCOUNTER — PATIENT MESSAGE (OUTPATIENT)
Dept: FAMILY MEDICINE | Facility: CLINIC | Age: 27
End: 2017-06-28

## 2017-06-28 PROCEDURE — 96372 THER/PROPH/DIAG INJ SC/IM: CPT | Mod: S$GLB,,, | Performed by: OBSTETRICS & GYNECOLOGY

## 2017-06-28 RX ADMIN — MEDROXYPROGESTERONE ACETATE 150 MG: 150 INJECTION, SUSPENSION INTRAMUSCULAR at 04:06

## 2017-06-28 NOTE — PROGRESS NOTES
Pt received 150mg IM Depo Provera to the RUOQ on 6/28/17. Pt tolerated well. Pt instructed to get next injection on 9/19/17. Pt verbalized understanding. Lot # [id number]   Exp date 1/20

## 2017-06-29 ENCOUNTER — TELEPHONE (OUTPATIENT)
Dept: FAMILY MEDICINE | Facility: CLINIC | Age: 27
End: 2017-06-29

## 2017-07-11 ENCOUNTER — PATIENT MESSAGE (OUTPATIENT)
Dept: FAMILY MEDICINE | Facility: CLINIC | Age: 27
End: 2017-07-11

## 2017-07-11 RX ORDER — CLONAZEPAM 1 MG/1
1 TABLET ORAL 2 TIMES DAILY
Refills: 2 | COMMUNITY
Start: 2017-05-09 | End: 2017-07-26 | Stop reason: SDUPTHER

## 2017-07-27 ENCOUNTER — PATIENT MESSAGE (OUTPATIENT)
Dept: FAMILY MEDICINE | Facility: CLINIC | Age: 27
End: 2017-07-27

## 2017-07-27 RX ORDER — CLONAZEPAM 1 MG/1
TABLET ORAL
Qty: 60 TABLET | Refills: 2 | Status: SHIPPED | OUTPATIENT
Start: 2017-07-27 | End: 2018-03-21 | Stop reason: SDUPTHER

## 2017-07-28 ENCOUNTER — TELEPHONE (OUTPATIENT)
Dept: FAMILY MEDICINE | Facility: CLINIC | Age: 27
End: 2017-07-28

## 2017-07-28 NOTE — TELEPHONE ENCOUNTER
Left msg for pt to inform her to schedule a follow-up appt with Dr. Wang for continual refills of her Clonzepam.

## 2017-08-18 ENCOUNTER — OFFICE VISIT (OUTPATIENT)
Dept: URGENT CARE | Facility: CLINIC | Age: 27
End: 2017-08-18
Payer: COMMERCIAL

## 2017-08-18 VITALS
HEIGHT: 67 IN | SYSTOLIC BLOOD PRESSURE: 112 MMHG | WEIGHT: 137 LBS | HEART RATE: 97 BPM | DIASTOLIC BLOOD PRESSURE: 76 MMHG | RESPIRATION RATE: 18 BRPM | OXYGEN SATURATION: 96 % | TEMPERATURE: 98 F | BODY MASS INDEX: 21.5 KG/M2

## 2017-08-18 DIAGNOSIS — M79.662 PAIN AND SWELLING OF LEFT LOWER LEG: Primary | ICD-10-CM

## 2017-08-18 DIAGNOSIS — M79.89 PAIN AND SWELLING OF LEFT LOWER LEG: Primary | ICD-10-CM

## 2017-08-18 PROCEDURE — 99213 OFFICE O/P EST LOW 20 MIN: CPT | Mod: S$GLB,,, | Performed by: FAMILY MEDICINE

## 2017-08-18 PROCEDURE — 3008F BODY MASS INDEX DOCD: CPT | Mod: S$GLB,,, | Performed by: FAMILY MEDICINE

## 2017-08-18 NOTE — PROGRESS NOTES
"Subjective:       Patient ID: Stephanie Harding is a 27 y.o. female.    Vitals:  height is 5' 7" (1.702 m) and weight is 62.1 kg (137 lb). Her temperature is 98.1 °F (36.7 °C). Her blood pressure is 112/76 and her pulse is 97. Her respiration is 18 and oxygen saturation is 96%.     Chief Complaint: Knee Pain    Patient was doing squats and now having left knee pain. Noticed redness and discomfort to the inside part of her left lower leg. Has a h/o injury to the lower leg few years ago.       Knee Pain    The incident occurred 12 to 24 hours ago. There was no injury mechanism. The pain is present in the left knee. The quality of the pain is described as aching. The pain is at a severity of 8/10. Associated symptoms include an inability to bear weight and a loss of motion. The symptoms are aggravated by movement. She has tried ice for the symptoms. The treatment provided no relief.     Review of Systems   Constitution: Negative for chills and fever.   HENT: Negative for headaches and sore throat.    Eyes: Negative for blurred vision.   Cardiovascular: Negative for chest pain.   Respiratory: Negative for shortness of breath.    Skin: Negative for rash.   Musculoskeletal: Positive for joint pain and joint swelling. Negative for back pain.   Gastrointestinal: Negative for abdominal pain, diarrhea, nausea and vomiting.   Psychiatric/Behavioral: The patient is not nervous/anxious.        Objective:      Physical Exam   Constitutional: She is oriented to person, place, and time. She appears well-developed and well-nourished.   HENT:   Head: Normocephalic and atraumatic.   Right Ear: External ear normal.   Left Ear: External ear normal.   Nose: Nose normal.   Mouth/Throat: Oropharynx is clear and moist.   Eyes: Conjunctivae, EOM and lids are normal. Pupils are equal, round, and reactive to light.   Neck: Trachea normal, full passive range of motion without pain and phonation normal. Neck supple.   Musculoskeletal: Normal range " of motion.   Neurological: She is alert and oriented to person, place, and time.   Skin: Skin is warm, dry and intact. There is erythema.        Psychiatric: She has a normal mood and affect. Her speech is normal and behavior is normal. Judgment and thought content normal. Cognition and memory are normal.   Nursing note and vitals reviewed.      Assessment:       1. Pain and swelling of left lower leg        Plan:         Pain and swelling of left lower leg  -     Cancel: Refer to Emergency Dept.    ER for further evaluation/workup. Pt v/u that we do not have US or lab in house.

## 2017-08-20 ENCOUNTER — TELEPHONE (OUTPATIENT)
Dept: URGENT CARE | Facility: CLINIC | Age: 27
End: 2017-08-20

## 2017-09-20 ENCOUNTER — OFFICE VISIT (OUTPATIENT)
Dept: OBSTETRICS AND GYNECOLOGY | Facility: CLINIC | Age: 27
End: 2017-09-20
Payer: COMMERCIAL

## 2017-09-20 VITALS
HEIGHT: 67 IN | DIASTOLIC BLOOD PRESSURE: 62 MMHG | WEIGHT: 132.5 LBS | SYSTOLIC BLOOD PRESSURE: 116 MMHG | BODY MASS INDEX: 20.8 KG/M2

## 2017-09-20 DIAGNOSIS — Z30.9 ENCOUNTER FOR CONTRACEPTIVE MANAGEMENT, UNSPECIFIED TYPE: Primary | ICD-10-CM

## 2017-09-20 PROCEDURE — 99999 PR PBB SHADOW E&M-EST. PATIENT-LVL II: CPT | Mod: PBBFAC,,, | Performed by: OBSTETRICS & GYNECOLOGY

## 2017-09-20 PROCEDURE — 99212 OFFICE O/P EST SF 10 MIN: CPT | Mod: S$GLB,,, | Performed by: OBSTETRICS & GYNECOLOGY

## 2017-09-20 PROCEDURE — 3008F BODY MASS INDEX DOCD: CPT | Mod: S$GLB,,, | Performed by: OBSTETRICS & GYNECOLOGY

## 2017-09-20 RX ORDER — NORETHINDRONE ACETATE AND ETHINYL ESTRADIOL .02; 1 MG/1; MG/1
1 TABLET ORAL DAILY
Qty: 30 TABLET | Refills: 11 | Status: SHIPPED | OUTPATIENT
Start: 2017-09-20 | End: 2018-04-12

## 2017-09-20 NOTE — PROGRESS NOTES
GYNECOLOGY OFFICE NOTE    Reason for visit: contraception    HPI: Pt is a 27 y.o.  female  who presents for contraception change. Has been on depo since 13 y/o. Desires to switch to OCP. The use of hormonal contraception has been fully discussed with the patient. We discussed all options including OCPs, transdermal patches, vaginal ring, Depo Provera injections, Nexplanon, and IUD. Warnings about anticipated minor side effects such as breakthrough spotting, nausea, breast tenderness, weight changes, acne, headaches, etc were given.  She has been told of the more serious potential side effects such as MI, stroke, and deep vein thrombosis, all of which are very unlikely.  She has been asked to report any signs of such serious problems immediately. The need for additional protection, such as a condom, to prevent exposure to sexually transmitted diseases has also been discussed- the patient has been clearly reminded that no hormonal contraceptive method can protect her against diseases such as HIV and others. She understands and wishes to begin OCP. We discussed need to stop smoking as well. Pt states she plans to stop within the next few weeks.       Past Medical History:   Diagnosis Date    Anxiety     HSV-2 (herpes simplex virus 2) infection     Irritable bowel syndrome     Smoker        Past Surgical History:   Procedure Laterality Date    CHOLECYSTECTOMY      COLONOSCOPY N/A 2017    Procedure: COLONOSCOPY;  Surgeon: Bib Haq Jr., MD;  Location: [facility];  Service: Endoscopy;  Laterality: N/A;    UPPER GASTROINTESTINAL ENDOSCOPY  2017    Dr. Nam    URETERAL REIMPLANTION         Family History   Problem Relation Age of Onset    Pancreatic cancer Paternal Grandfather     Irritable bowel syndrome Mother     Bipolar disorder Father     Lupus Maternal Grandmother     Colon cancer Neg Hx     Colon polyps Neg Hx     Crohn's disease Neg Hx     Ulcerative  "colitis Neg Hx        Social History   Substance Use Topics    Smoking status: Current Every Day Smoker     Packs/day: 0.50     Years: 11.00     Types: Cigarettes     Start date: 2007    Smokeless tobacco: Never Used    Alcohol use 4.2 oz/week     7 Cans of beer per week       OB History    Para Term  AB Living   0 0 0 0 0 0   SAB TAB Ectopic Multiple Live Births   0 0 0 0               Current Outpatient Prescriptions   Medication Sig    buPROPion (WELLBUTRIN XL) 150 MG TB24 tablet Take 1 tablet (150 mg total) by mouth once daily.    clonazePAM (KLONOPIN) 1 MG tablet TAKE 1 TABLET BY MOUTH TWICE A DAY AS NEEDED FOR ANXIETY    escitalopram oxalate (LEXAPRO) 10 MG tablet Take 1 tablet (10 mg total) by mouth once daily.    norethindrone-ethinyl estradiol (MICROGESTIN ) 1-20 mg-mcg per tablet Take 1 tablet by mouth once daily.     No current facility-administered medications for this visit.        Allergies: Review of patient's allergies indicates no known allergies.     /62   Ht 5' 7" (1.702 m)   Wt 60.1 kg (132 lb 7.9 oz)   BMI 20.75 kg/m²     ROS:  GENERAL: Denies fever or chills.   SKIN: Denies rash or lesions.   HEAD: Denies head injury or headache.   CHEST: Denies chest pain or shortness of breath.   CARDIOVASCULAR: Denies palpitations or chest pain.   ABDOMEN: No abdominal pain, constipation, diarrhea, nausea, vomiting or rectal bleeding.   URINARY: No dysuria, hematuria, or burning on urination.  REPRODUCTIVE: See HPI.   BREASTS: Denies pain, lumps, or nipple discharge.   NEUROLOGIC: Denies syncope or weakness.     Physical Exam:  GENERAL: alert, appears stated age and cooperative  CHEST: Normal respiratory effort  HEART: S1 and S2 normal, regular rate and rhythm  NECK: normal appearance, no thyromegaly masses or tenderness  SKIN: no acne, striae, hirsutism  Talk only    Diagnosis:  1. Encounter for contraceptive management, unspecified type        Plan:   1. Rx ocp sent. "       Orders Placed This Encounter    norethindrone-ethinyl estradiol (MICROGESTIN 1/20) 1-20 mg-mcg per tablet       Patient was counseled today on the new ACS guidelines for cervical cytology screening as well as the current recommendations for breast cancer screening. She was counseled to follow up with her PCP for other routine health maintenance.     Return for annual.      Mimi Bowers MD  OB/GYN  Pager: [phone]

## 2018-02-16 RX ORDER — CLONAZEPAM 1 MG/1
TABLET ORAL
Qty: 60 TABLET | OUTPATIENT
Start: 2018-02-16

## 2018-02-27 ENCOUNTER — OFFICE VISIT (OUTPATIENT)
Dept: URGENT CARE | Facility: CLINIC | Age: 28
End: 2018-02-27
Payer: COMMERCIAL

## 2018-02-27 VITALS
BODY MASS INDEX: 20.72 KG/M2 | RESPIRATION RATE: 16 BRPM | DIASTOLIC BLOOD PRESSURE: 75 MMHG | HEIGHT: 67 IN | TEMPERATURE: 98 F | OXYGEN SATURATION: 96 % | WEIGHT: 132 LBS | HEART RATE: 83 BPM | SYSTOLIC BLOOD PRESSURE: 103 MMHG

## 2018-02-27 DIAGNOSIS — R19.7 DIARRHEA, UNSPECIFIED TYPE: ICD-10-CM

## 2018-02-27 DIAGNOSIS — R68.89 FLU-LIKE SYMPTOMS: Primary | ICD-10-CM

## 2018-02-27 DIAGNOSIS — R11.0 NAUSEA: ICD-10-CM

## 2018-02-27 LAB
CTP QC/QA: YES
FLUAV AG NPH QL: NEGATIVE
FLUBV AG NPH QL: NEGATIVE

## 2018-02-27 PROCEDURE — 3008F BODY MASS INDEX DOCD: CPT | Mod: S$GLB,,, | Performed by: FAMILY MEDICINE

## 2018-02-27 PROCEDURE — 99214 OFFICE O/P EST MOD 30 MIN: CPT | Mod: S$GLB,,, | Performed by: FAMILY MEDICINE

## 2018-02-27 PROCEDURE — 87804 INFLUENZA ASSAY W/OPTIC: CPT | Mod: 59,QW,S$GLB, | Performed by: FAMILY MEDICINE

## 2018-02-27 RX ORDER — DICYCLOMINE HYDROCHLORIDE 10 MG/1
10 CAPSULE ORAL 3 TIMES DAILY PRN
Qty: 90 CAPSULE | Refills: 0 | Status: SHIPPED | OUTPATIENT
Start: 2018-02-27 | End: 2018-03-20 | Stop reason: ALTCHOICE

## 2018-02-27 RX ORDER — ONDANSETRON 4 MG/1
4 TABLET, ORALLY DISINTEGRATING ORAL EVERY 8 HOURS PRN
Qty: 30 TABLET | Refills: 1 | Status: SHIPPED | OUTPATIENT
Start: 2018-02-27 | End: 2018-03-20 | Stop reason: ALTCHOICE

## 2018-02-27 NOTE — PROGRESS NOTES
"Subjective:       Patient ID: Stephanie Harding is a 28 y.o. female.    Vitals:  height is 5' 7" (1.702 m) and weight is 59.9 kg (132 lb). Her temperature is 98 °F (36.7 °C). Her blood pressure is 103/75 and her pulse is 83. Her respiration is 16 and oxygen saturation is 96%.     Chief Complaint: Nausea (pt said she has been sick for 3 days)    Nausea   This is a new problem. The current episode started in the past 7 days. The problem occurs intermittently. The problem has been unchanged. Associated symptoms include abdominal pain, headaches and myalgias. Pertinent negatives include no chest pain, chills, fever, nausea or vomiting. The symptoms are aggravated by eating and drinking. She has tried NSAIDs and drinking for the symptoms. The treatment provided mild relief.     Review of Systems   Constitution: Positive for night sweats. Negative for chills and fever.   Cardiovascular: Negative for chest pain.   Respiratory: Negative for shortness of breath.    Musculoskeletal: Positive for myalgias. Negative for back pain.   Gastrointestinal: Positive for abdominal pain and diarrhea. Negative for constipation, hematochezia, melena, nausea and vomiting.   Genitourinary: Negative for dysuria.   Neurological: Positive for headaches.       Objective:      Physical Exam   Constitutional: She is oriented to person, place, and time. She appears well-developed and well-nourished. She is cooperative.  Non-toxic appearance. She does not appear ill. No distress.   HENT:   Head: Normocephalic and atraumatic.   Right Ear: Hearing, tympanic membrane, external ear and ear canal normal.   Left Ear: Hearing, tympanic membrane, external ear and ear canal normal.   Nose: Nose normal. No mucosal edema, rhinorrhea or nasal deformity. No epistaxis. Right sinus exhibits no maxillary sinus tenderness and no frontal sinus tenderness. Left sinus exhibits no maxillary sinus tenderness and no frontal sinus tenderness.   Mouth/Throat: Uvula is midline, " oropharynx is clear and moist and mucous membranes are normal. No trismus in the jaw. Normal dentition. No uvula swelling. No posterior oropharyngeal erythema.   Eyes: Conjunctivae and lids are normal. No scleral icterus.   Sclera clear bilat   Neck: Trachea normal, full passive range of motion without pain and phonation normal. Neck supple.   Cardiovascular: Normal rate, regular rhythm, normal heart sounds and normal pulses.    Pulmonary/Chest: Effort normal. No respiratory distress.   Abdominal: Soft. Normal appearance and bowel sounds are normal. She exhibits no distension. There is no tenderness.   Musculoskeletal: Normal range of motion. She exhibits no edema or deformity.   Neurological: She is alert and oriented to person, place, and time. She exhibits normal muscle tone. Coordination normal.   Skin: Skin is warm, dry and intact. She is not diaphoretic. No pallor.   Psychiatric: She has a normal mood and affect. Her speech is normal and behavior is normal. Judgment and thought content normal. Cognition and memory are normal.   Nursing note and vitals reviewed.      Assessment:       1. Flu-like symptoms    2. Nausea    3. Diarrhea, unspecified type        Plan:         Flu-like symptoms  -     POCT Influenza A/B    Nausea    Diarrhea, unspecified type    Other orders  -     ondansetron (ZOFRAN-ODT) 4 MG TbDL; Take 1 tablet (4 mg total) by mouth every 8 (eight) hours as needed.  Dispense: 30 tablet; Refill: 1  -     dicyclomine (BENTYL) 10 MG capsule; Take 1 capsule (10 mg total) by mouth 3 (three) times daily as needed.  Dispense: 90 capsule; Refill: 0

## 2018-02-27 NOTE — LETTER
February 27, 2018      [facility]  [address], [address]  [facility] [address]  Phone: [phone]  Fax: [phone]       Patient: Stephanie Harding   YOB: 1990  Date of Visit: 02/27/2018    To Whom It May Concern:    Chas Harding  was at [facility] on 02/27/2018. Please excuse for work/school for 3 days unless well enough to return sooner  . If you have any questions or concerns, or if I can be of further assistance, please do not hesitate to contact me.    Sincerely,    Reese Davis MD

## 2018-03-15 RX ORDER — CLONAZEPAM 1 MG/1
TABLET ORAL
Qty: 60 TABLET | OUTPATIENT
Start: 2018-03-15

## 2018-03-20 ENCOUNTER — OFFICE VISIT (OUTPATIENT)
Dept: DERMATOLOGY | Facility: CLINIC | Age: 28
End: 2018-03-20
Payer: COMMERCIAL

## 2018-03-20 VITALS — HEIGHT: 67 IN | WEIGHT: 132 LBS | BODY MASS INDEX: 20.72 KG/M2 | RESPIRATION RATE: 16 BRPM

## 2018-03-20 DIAGNOSIS — D22.9 BENIGN NEVUS: ICD-10-CM

## 2018-03-20 DIAGNOSIS — L98.9 DISEASE OF SKIN AND SUBCUTANEOUS TISSUE: ICD-10-CM

## 2018-03-20 DIAGNOSIS — L70.0 CYSTIC ACNE VULGARIS: ICD-10-CM

## 2018-03-20 DIAGNOSIS — Z12.83 SKIN CANCER SCREENING: Primary | ICD-10-CM

## 2018-03-20 PROCEDURE — 88305 TISSUE EXAM BY PATHOLOGIST: CPT | Performed by: PATHOLOGY

## 2018-03-20 PROCEDURE — 99203 OFFICE O/P NEW LOW 30 MIN: CPT | Mod: 25,S$GLB,, | Performed by: DERMATOLOGY

## 2018-03-20 PROCEDURE — 11100 PR BIOPSY OF SKIN LESION: CPT | Mod: S$GLB,,, | Performed by: DERMATOLOGY

## 2018-03-20 PROCEDURE — 99999 PR PBB SHADOW E&M-EST. PATIENT-LVL III: CPT | Mod: PBBFAC,,, | Performed by: DERMATOLOGY

## 2018-03-20 RX ORDER — CLONAZEPAM 1 MG/1
1 TABLET ORAL 2 TIMES DAILY
Qty: 60 TABLET | Refills: 2 | OUTPATIENT
Start: 2018-03-20

## 2018-03-20 RX ORDER — SPIRONOLACTONE 100 MG/1
TABLET, FILM COATED ORAL
Qty: 30 TABLET | Refills: 6 | Status: SHIPPED | OUTPATIENT
Start: 2018-03-20 | End: 2018-04-12

## 2018-03-20 RX ORDER — ADAPALENE AND BENZOYL PEROXIDE 3; 25 MG/G; MG/G
GEL TOPICAL
Qty: 45 G | Refills: 3 | Status: SHIPPED | OUTPATIENT
Start: 2018-03-20 | End: 2018-05-30

## 2018-03-20 NOTE — PROGRESS NOTES
Subjective:       Patient ID:  Stephanie Harding is a 28 y.o. female who presents for   Chief Complaint   Patient presents with    Acne     Initial visit for acne to face . Acne to back & chest at times . Has treated w/ Pro Active  qhs - little improvement . Many years, worse in 2010. . Denies excessive dryness with proactive. C/o oily skin.   Spironolactone w/ some improvement -using her mom's Rx,  mom with cystic acne. Grandmother as well.     Lesion on back raised & pink in color - years  . Never drains . Never treated   Uses sun tanning gonzalez , few times a week . approx 1 month  No phx skin ca     history MEENU  Currently on OCP, not pregnant or trying to get pregnant at this time    Numerous tattoos. Left leg pink flowered tattoo with itching since 2012. No history sarcoid. No other tattoos with similar reaction. Treated with topical steroids in past. Helps, recurs when stops using. No sob    Review of Systems   Constitutional: Negative for weight loss and weight gain.   Gastrointestinal: Positive for abdominal pain.   Skin: Positive for itching and dry skin. Negative for daily sunscreen use, activity-related sunscreen use, sensitivity to antibiotic ointment, sensitivity to bandage adhesive and tendency to form keloidal scars.   Psychiatric/Behavioral: Positive for depressed mood and anxiety.   Hematologic/Lymphatic: Bruises/bleeds easily.        Objective:    Physical Exam   Constitutional: She appears well-developed and well-nourished. No distress.   HENT:   Mouth/Throat: Lips normal.    Eyes: Lids are normal.  No conjunctival no injection.   Cardiovascular: There is no local extremity swelling and no dependent edema.     Neurological: She is alert and oriented to person, place, and time. She is not disoriented.   Psychiatric: She has a normal mood and affect.   Skin:   Areas Examined (abnormalities noted in diagram):   Head / Face Inspection Performed  Neck Inspection Performed  Chest / Axilla Inspection  Performed  Abdomen Inspection Performed  Back Inspection Performed  RUE Inspected  LUE Inspection Performed  RLE Inspected  LLE Inspection Performed                   Diagram Legend     Erythematous scaling macule/papule c/w actinic keratosis       Vascular papule c/w angioma      Pigmented verrucoid papule/plaque c/w seborrheic keratosis      Yellow umbilicated papule c/w sebaceous hyperplasia      Irregularly shaped tan macule c/w lentigo     1-2 mm smooth white papules consistent with Milia      Movable subcutaneous cyst with punctum c/w epidermal inclusion cyst      Subcutaneous movable cyst c/w pilar cyst      Firm pink to brown papule c/w dermatofibroma      Pedunculated fleshy papule(s) c/w skin tag(s)      Evenly pigmented macule c/w junctional nevus     Mildly variegated pigmented, slightly irregular-bordered macule c/w mildly atypical nevus      Flesh colored to evenly pigmented papule c/w intradermal nevus       Pink pearly papule/plaque c/w basal cell carcinoma      Erythematous hyperkeratotic cursted plaque c/w SCC      Surgical scar with no sign of skin cancer recurrence      Open and closed comedones      Inflammatory papules and pustules      Verrucoid papule consistent consistent with wart     Erythematous eczematous patches and plaques     Dystrophic onycholytic nail with subungual debris c/w onychomycosis     Umbilicated papule    Erythematous-base heme-crusted tan verrucoid plaque consistent with inflamed seborrheic keratosis     Erythematous Silvery Scaling Plaque c/w Psoriasis     See annotation      Assessment / Plan:      Pathology Orders:     Normal Orders This Visit    Tissue Specimen To Pathology, Dermatology     Questions:    Directional Terms:  Other(comment)    Clinical information:  scaling and erythema within red dye in tattoo- r/o allergic dermatitis vs infiltrative dermatitis vs lichenoid reaction.    Specific Site:  left leg        Skin cancer screening  Upper body skin examination  performed today including at least 6 points as noted in physical examination. No lesions suspicious for malignancy noted.        Cystic acne vulgaris  -     EPIDUO FORTE 0.3-2.5 % GlwP; AAA face qhs  Dispense: 45 g; Refill: 3  -     spironolactone (ALDACTONE) 100 MG tablet; Start with 1 po qday  Dispense: 30 tablet; Refill: 6    Discussed benefits and risks of therapy including but not limited to breakthrough bleeding, breast tenderness, and elevated potassium levels which may give symptoms of fatigue, palpitations, and nausea. Patient should limit potassium intake - avoid potassium supplements or salt substitutes, limit bananas and citrus fruits. Pregnancy must be avoided while taking spironolactone.      Disease of skin and subcutaneous tissue  -     Tissue Specimen To Pathology, Dermatology    Punch biopsy procedure note:  Punch biopsy performed after verbal consent obtained. Area marked and prepped with alcohol. Approximately 1cc of 1% lidocaine with epinephrine injected. 4 mm disposable punch used to remove lesion. Hemostasis obtained and biopsy site closed with 1 - 2 nylon sutures. Wound care instructions reviewed with patient and handout given.    Benign nevus, back  Discussed ABCDE's of nevi.  Monitor for new mole or moles that are becoming bigger, darker, irritated, or developing irregular borders.              Follow-up in about 2 weeks (around 4/3/2018) for nurse visit for suture removal.

## 2018-03-20 NOTE — LETTER
March 20, 2018      Aydin Wang MD  [address]  [address]  [facility] [address]           [facility]  [address] [facility] [address] [place] [address]  Phone: [phone]  Fax: [phone]          Patient: Stephanie Harding   MR Number: [id number]   YOB: 1990   Date of Visit: 3/20/2018       Dear Dr. Aydin Wang:    Thank you for referring Stephanie Harding to me for evaluation. Attached you will find relevant portions of my assessment and plan of care.    If you have questions, please do not hesitate to call me. I look forward to following Stephanie Harding along with you.    Sincerely,    Nila Oro MD    Enclosure  CC:  No Recipients    If you would like to receive this communication electronically, please contact [email or web address] or [phone] to request more information on [facility] access.    For providers and/or their staff who would like to refer a patient to [facility], please contact us through our one-stop-shop provider referral line, [facility], at [phone].    If you feel you have received this communication in error or would no longer like to receive these types of communications, please e-mail [email or web address]

## 2018-03-21 ENCOUNTER — PATIENT MESSAGE (OUTPATIENT)
Dept: FAMILY MEDICINE | Facility: CLINIC | Age: 28
End: 2018-03-21

## 2018-03-21 RX ORDER — CLONAZEPAM 1 MG/1
TABLET ORAL
Qty: 60 TABLET | Refills: 0 | Status: SHIPPED | OUTPATIENT
Start: 2018-03-21 | End: 2018-05-28 | Stop reason: SDUPTHER

## 2018-03-21 RX ORDER — CLONAZEPAM 1 MG/1
1 TABLET ORAL 2 TIMES DAILY
Qty: 60 TABLET | Refills: 1 | Status: SHIPPED | OUTPATIENT
Start: 2018-03-21 | End: 2018-03-21 | Stop reason: SDUPTHER

## 2018-03-22 ENCOUNTER — PATIENT MESSAGE (OUTPATIENT)
Dept: GASTROENTEROLOGY | Facility: CLINIC | Age: 28
End: 2018-03-22

## 2018-03-24 ENCOUNTER — PATIENT MESSAGE (OUTPATIENT)
Dept: DERMATOLOGY | Facility: CLINIC | Age: 28
End: 2018-03-24

## 2018-03-27 ENCOUNTER — TELEPHONE (OUTPATIENT)
Dept: DERMATOLOGY | Facility: CLINIC | Age: 28
End: 2018-03-27

## 2018-03-27 NOTE — TELEPHONE ENCOUNTER
"Called home # twice [phone] . Female answered  & stated that this is no longer her # .     ----- Message from Nila Oro MD sent at 3/27/2018 10:49 AM CDT -----  Please call pt with results. Biopsy revealed a hypersensitivity/allergic reaction to tattoo dye. I am happy to send a prescription cream to her pharmacy for this, another alterntive would be injections of the area with steroids. Please schedule her for f/u if she would like injections. Also, she had a question about acne treatments while trying to get pregnant. I responded to her message via [facility]. There are no oral acne meds but we can try finacea. Please advise thanks    FINAL PATHOLOGIC DIAGNOSIS  1. Skin, left leg, punch biopsy:  - FEATURES CONSISTENT WITH A HYPERSENSITIVITY REACTION TO TATTOO PIGMENTATION WITH  OVERLYING FEATURES OF LICHEN SIMPLEX CHRONICUS.  MICROSCOPIC DESCRIPTION: Sections show a punch biopsy of skin extending to the level of the subcutis.  Sections show irregular epidermal hyperplasia with hypergranulosis, hyperkeratosis and an underlying, vertically  oriented papillary dermal fibrosis. Within the superficial to mid dermis, there is a fairly brisk perivascular  lymphohistiocytic infiltrate with rarer plasma cells. Granular tattoo pigment is also noted within the superficial  dermis, primarily in a perivascular distribution, both free within the dermis and within macrophages.  Diagnosed by: Rao Lara M.D.  (Electronically Signed: 2018-03-26 12:53:08)  Gross Description  Pathology ID [id number]; Surgery ID [id number]  Received in formalin labeled "left leg" is a 0.4 x 0.4 cm round tan-pink skin excised to a depth of 0.4 cm. The  specimen is inked blue, bisected, and entirely submitted in one cassette.  Shamika Miller    "

## 2018-04-02 ENCOUNTER — TELEPHONE (OUTPATIENT)
Dept: DERMATOLOGY | Facility: CLINIC | Age: 28
End: 2018-04-02

## 2018-04-02 NOTE — TELEPHONE ENCOUNTER
----- Message from Hayley Yost sent at 4/2/2018  9:25 AM CDT -----  Contact: [phone]  Patient is returning nurse's phone call.  Please call patient back at [phone].

## 2018-04-03 ENCOUNTER — CLINICAL SUPPORT (OUTPATIENT)
Dept: DERMATOLOGY | Facility: CLINIC | Age: 28
End: 2018-04-03
Payer: COMMERCIAL

## 2018-04-03 NOTE — PROGRESS NOTES
"Sutures removed from left lower leg  No sign of infection  Healing wound intact  No pt concerns   Sutures easily removed    FINAL PATHOLOGIC DIAGNOSIS  1. Skin, left leg, punch biopsy:  - FEATURES CONSISTENT WITH A HYPERSENSITIVITY REACTION TO TATTOO PIGMENTATION WITH  OVERLYING FEATURES OF LICHEN SIMPLEX CHRONICUS.  MICROSCOPIC DESCRIPTION: Sections show a punch biopsy of skin extending to the level of the subcutis.  Sections show irregular epidermal hyperplasia with hypergranulosis, hyperkeratosis and an underlying, vertically  oriented papillary dermal fibrosis. Within the superficial to mid dermis, there is a fairly brisk perivascular  lymphohistiocytic infiltrate with rarer plasma cells. Granular tattoo pigment is also noted within the superficial  dermis, primarily in a perivascular distribution, both free within the dermis and within macrophages.  Diagnosed by: Rao Lara M.D.  (Electronically Signed: 2018-03-26 12:53:08)  Gross Description  Pathology ID [id number]; Surgery ID [id number]  Received in formalin labeled "left leg" is a 0.4 x 0.4 cm round tan-pink skin excised to a depth of 0.4 cm. The  specimen is inked blue, bisected, and entirely submitted in one cassette.  Shamika Miller             "

## 2018-04-09 ENCOUNTER — PATIENT MESSAGE (OUTPATIENT)
Dept: OBSTETRICS AND GYNECOLOGY | Facility: CLINIC | Age: 28
End: 2018-04-09

## 2018-04-09 ENCOUNTER — TELEPHONE (OUTPATIENT)
Dept: OBSTETRICS AND GYNECOLOGY | Facility: CLINIC | Age: 28
End: 2018-04-09

## 2018-04-09 ENCOUNTER — TELEPHONE (OUTPATIENT)
Dept: DERMATOLOGY | Facility: CLINIC | Age: 28
End: 2018-04-09

## 2018-04-09 NOTE — TELEPHONE ENCOUNTER
----- Message from Melissa Avila sent at 4/9/2018 11:39 AM CDT -----  No. [phone]   Patient discontinued taking her birth control around 3/25/18.  She has been on her cycle for 2 1/2 weeks now.    Please call.

## 2018-04-09 NOTE — TELEPHONE ENCOUNTER
Pt Comments: I would like to visit this option later down the road, as the tattoo is currently not bothering me. I would like to save on the expense

## 2018-04-12 ENCOUNTER — OFFICE VISIT (OUTPATIENT)
Dept: OBSTETRICS AND GYNECOLOGY | Facility: CLINIC | Age: 28
End: 2018-04-12
Payer: COMMERCIAL

## 2018-04-12 ENCOUNTER — PATIENT MESSAGE (OUTPATIENT)
Dept: FAMILY MEDICINE | Facility: CLINIC | Age: 28
End: 2018-04-12

## 2018-04-12 VITALS
WEIGHT: 142.19 LBS | SYSTOLIC BLOOD PRESSURE: 78 MMHG | DIASTOLIC BLOOD PRESSURE: 62 MMHG | HEIGHT: 67 IN | BODY MASS INDEX: 22.32 KG/M2

## 2018-04-12 DIAGNOSIS — F33.1 MAJOR DEPRESSIVE DISORDER, RECURRENT, MODERATE: ICD-10-CM

## 2018-04-12 DIAGNOSIS — N93.9 ABNORMAL UTERINE BLEEDING (AUB): ICD-10-CM

## 2018-04-12 DIAGNOSIS — F41.1 GENERALIZED ANXIETY DISORDER: ICD-10-CM

## 2018-04-12 DIAGNOSIS — Z01.419 ENCOUNTER FOR ANNUAL ROUTINE GYNECOLOGICAL EXAMINATION: Primary | ICD-10-CM

## 2018-04-12 PROCEDURE — 99395 PREV VISIT EST AGE 18-39: CPT | Mod: S$GLB,,, | Performed by: OBSTETRICS & GYNECOLOGY

## 2018-04-12 PROCEDURE — 99999 PR PBB SHADOW E&M-EST. PATIENT-LVL III: CPT | Mod: PBBFAC,,, | Performed by: OBSTETRICS & GYNECOLOGY

## 2018-04-12 RX ORDER — BUPROPION HYDROCHLORIDE 150 MG/1
150 TABLET ORAL DAILY
Qty: 30 TABLET | Refills: 1 | Status: SHIPPED | OUTPATIENT
Start: 2018-04-12 | End: 2018-06-26 | Stop reason: SDUPTHER

## 2018-04-12 RX ORDER — ESCITALOPRAM OXALATE 10 MG/1
10 TABLET ORAL DAILY
Qty: 30 TABLET | Refills: 1 | Status: SHIPPED | OUTPATIENT
Start: 2018-04-12 | End: 2018-06-26 | Stop reason: SDUPTHER

## 2018-04-12 NOTE — TELEPHONE ENCOUNTER
Spoke w/ pt ,pt states she needs to be seen for medication adjustments. Pt has not been seen by Dr Becerra since March 2017.   Pt requesting appt Monday. No appts available on Monday. Offered pt another day , pt states that she can only take off of work this Monday. Pt sched for Dr Becerra next Saturday clinic. Pt placed on cancellation list .--lp

## 2018-04-12 NOTE — PROGRESS NOTES
Chief Complaint   Patient presents with    Vaginal Bleeding       HISTORY OF PRESENT ILLNESS:   Stephanie Harding is a 28 y.o. female  who presents for well woman exam.  Patient's last menstrual period was 03/24/2018..  She has complains of AUB. She was on depo-provera for 15 years then changed to OCPs 9/2017. She stopped OCPs almost a month ago to try to conceive. She then started bleeding 3/24/18 and reports she has been bleeding like a cycle for almost 3 weeks. Over the last 3 days has lightened and is more spotting. Cycles before she started the depo were regular.   She reports her moods have changed since stopping the OCPs and she feels like she needs to have her anxiety medications changed.      Past Medical History:   Diagnosis Date    Anxiety     HSV-2 (herpes simplex virus 2) infection 2014    Irritable bowel syndrome     Smoker           Past Surgical History:   Procedure Laterality Date    CHOLECYSTECTOMY  2012    COLONOSCOPY N/A 5/2/2017    Procedure: COLONOSCOPY;  Surgeon: Bib Haq Jr., MD;  [facility]: [facility];  Service: Endoscopy;  Laterality: N/A;    UPPER GASTROINTESTINAL ENDOSCOPY  03/09/2017    Dr. Nam    URETERAL REIMPLANTION           Social History     Social History    Marital status: Single     Spouse name: N/A    Number of children: N/A    Years of education: N/A     Occupational History          Social History Main Topics    Smoking status: Current Every Day Smoker     Packs/day: 0.50     Years: 11.00     Types: Cigarettes     Start date: 1/1/2007    Smokeless tobacco: Never Used    Alcohol use 4.2 oz/week     7 Cans of beer per week    Drug use: No    Sexual activity: No     Other Topics Concern    Not on file     Social History Narrative    No narrative on file       Family History   Problem Relation Age of Onset    Pancreatic cancer Paternal Grandfather     Irritable bowel syndrome Mother     Bipolar disorder Father     Lupus  "Maternal Grandmother     Colon cancer Neg Hx     Colon polyps Neg Hx     Crohn's disease Neg Hx     Ulcerative colitis Neg Hx          OB History    Para Term  AB Living   0 0 0 0 0 0   SAB TAB Ectopic Multiple Live Births   0 0 0 0                 COMPREHENSIVE GYN HISTORY:  PAP History: Denies abnormal Paps  Infection History: Denies STDs. Denies PID.  Benign History:Denies uterine fibroids. Denies ovarian cysts. Denies endometriosis Denies other conditions.  Cancer History: Denies cervical cancer. Denies uterine cancer or hyperplasia. Denies ovarian cancer. Denies vulvar cancer or pre-cancer. Denies vaginal cancer or pre-cancer. Denies breast cancer. Denies colon cancer.  Cycle:     ROS:  GENERAL: Denies weight gain or weight loss. Feeling well overall.   SKIN: Denies rash or lesions.   HEAD: Denies headache.   NODES: Denies enlarged lymph nodes.   CHEST: Denies shortness of breath.   ABDOMEN: No abdominal pain, constipation, diarrhea, nausea, vomiting or rectal bleeding.   URINARY: No frequency, dysuria, hematuria, or burning on urination.  REPRODUCTIVE: See HPI.   BREASTS: The patient denies pain, lumps, or nipple discharge.       BP (!) 78/62   Ht 5' 7" (1.702 m)   Wt 64.5 kg (142 lb 3.2 oz)   LMP 2018   BMI 22.27 kg/m²     APPEARANCE: Well nourished, well developed, in no acute distress.  NECK: Neck symmetric without  thyromegaly.  NODES: No inguinal, cervical lymph node enlargement.  CHEST: Lungs clear to auscultation.  HEART: Regular rate and rhythm, no murmurs, rubs or gallops.  ABDOMEN: Soft. No tenderness or masses. No hernias. No hepatosplenomegaly.  BREASTS: Symmetrical, no skin changes or visible lesions. No palpable masses, nipple discharge or adenopathy bilaterally.  PELVIC:   VULVA: No lesions. Normal female genitalia.  URETHRAL MEATUS: Normal size and location, no lesions, no prolapse.  URETHRA: No masses, tenderness, prolapse or scarring.  VAGINA: Moist and " well rugated, no discharge, no significant cystocele or rectocele. Very scant amount of brown discharge, no active bleeding  CERVIX: No lesions and discharge.  UTERUS: Normal size, regular shape, mobile, non-tender, bladder base nontender.  ADNEXA: No masses or tenderness.    Data Reviewed:   Last Pap: Date: 10/2016 NILM    UPT negative    1. Encounter for annual routine gynecological examination    2. Abnormal uterine bleeding (AUB)    3. Generalized anxiety disorder    4. Major depressive disorder, recurrent, moderate        Plan:  Routine gyn s/p normal breast exam. Pap up to date.    2. Will get US and if EMS is thin will continue to watch as likely was anovualtory bleeding that hopefully will resolve as she comes off the birth control pills. If thickened lining will do a cycle of provera and monitor. Will also get TSH and CBC. Will f/u in 3 months to re-access bleeding. Will take PNV and discussed smoking cessatin. If she decides to restart OCPS will call us so we can send it in.   3. Will f/u with her psych for anxiety and adjustment of medications.     F/u in 3 months

## 2018-04-12 NOTE — TELEPHONE ENCOUNTER
----- Message from Mary Lou Diaz sent at 4/12/2018  3:37 PM CDT -----  Contact: self  Patient [phone] is returning call to Nurse Valentine from this afternoon/please call

## 2018-04-17 ENCOUNTER — PATIENT MESSAGE (OUTPATIENT)
Dept: OBSTETRICS AND GYNECOLOGY | Facility: CLINIC | Age: 28
End: 2018-04-17

## 2018-04-17 ENCOUNTER — TELEPHONE (OUTPATIENT)
Dept: DERMATOLOGY | Facility: CLINIC | Age: 28
End: 2018-04-17

## 2018-04-17 ENCOUNTER — PATIENT MESSAGE (OUTPATIENT)
Dept: DERMATOLOGY | Facility: CLINIC | Age: 28
End: 2018-04-17

## 2018-04-17 DIAGNOSIS — L70.0 ACNE VULGARIS: Primary | ICD-10-CM

## 2018-04-17 RX ORDER — SPIRONOLACTONE 100 MG/1
TABLET, FILM COATED ORAL
Qty: 30 TABLET | Refills: 3 | Status: SHIPPED | OUTPATIENT
Start: 2018-04-17 | End: 2019-01-23 | Stop reason: SDUPTHER

## 2018-04-17 RX ORDER — NORETHINDRONE ACETATE AND ETHINYL ESTRADIOL .02; 1 MG/1; MG/1
1 TABLET ORAL DAILY
Qty: 30 TABLET | Refills: 11 | Status: SHIPPED | OUTPATIENT
Start: 2018-04-17 | End: 2018-10-16

## 2018-04-17 NOTE — TELEPHONE ENCOUNTER
Dr Oro,      I had stopped taking my Spironolactone because of the thought of getting preganant and have decided that my partner and I are not ready. Would you be able to send this to the pharmacy for refills again? I went to see my ob gyn and I think she removed it from my medications list.     Thanks so much,   [facility]      4-17- 18 Pt contacted & advised .         No problem. Will send in. Please remind patient that she needs to stop medication immediately if decides to become pregnant and to use some form of contraception while taking this medication

## 2018-05-29 ENCOUNTER — PATIENT MESSAGE (OUTPATIENT)
Dept: FAMILY MEDICINE | Facility: CLINIC | Age: 28
End: 2018-05-29

## 2018-05-29 RX ORDER — CLONAZEPAM 1 MG/1
TABLET ORAL
Qty: 60 TABLET | Refills: 0 | Status: SHIPPED | OUTPATIENT
Start: 2018-05-29 | End: 2018-08-21 | Stop reason: SDUPTHER

## 2018-05-29 NOTE — TELEPHONE ENCOUNTER
----- Message from Eliza Jesica sent at 5/29/2018  1:37 PM CDT -----  Contact: self, [phone]  Patient called in returning your call. Please advise.

## 2018-05-30 ENCOUNTER — OFFICE VISIT (OUTPATIENT)
Dept: FAMILY MEDICINE | Facility: CLINIC | Age: 28
End: 2018-05-30
Payer: COMMERCIAL

## 2018-05-30 VITALS
HEIGHT: 67 IN | DIASTOLIC BLOOD PRESSURE: 68 MMHG | HEART RATE: 83 BPM | BODY MASS INDEX: 21.7 KG/M2 | WEIGHT: 138.25 LBS | SYSTOLIC BLOOD PRESSURE: 93 MMHG | OXYGEN SATURATION: 98 % | TEMPERATURE: 99 F

## 2018-05-30 DIAGNOSIS — F41.1 GENERALIZED ANXIETY DISORDER: ICD-10-CM

## 2018-05-30 DIAGNOSIS — F33.1 MAJOR DEPRESSIVE DISORDER, RECURRENT, MODERATE: ICD-10-CM

## 2018-05-30 DIAGNOSIS — G47.00 INSOMNIA, UNSPECIFIED TYPE: Primary | ICD-10-CM

## 2018-05-30 PROCEDURE — 99999 PR PBB SHADOW E&M-EST. PATIENT-LVL III: CPT | Mod: PBBFAC,,, | Performed by: FAMILY MEDICINE

## 2018-05-30 PROCEDURE — 99214 OFFICE O/P EST MOD 30 MIN: CPT | Mod: S$GLB,,, | Performed by: FAMILY MEDICINE

## 2018-05-30 RX ORDER — HYDROXYZINE HYDROCHLORIDE 25 MG/1
TABLET, FILM COATED ORAL
Qty: 60 TABLET | Refills: 11 | Status: SHIPPED | OUTPATIENT
Start: 2018-05-30 | End: 2019-02-13

## 2018-05-30 NOTE — PROGRESS NOTES
(Portions of this note were dictated using voice recognition software and may contain dictation related errors in spelling/grammar/syntax not found on text review)    CC:   Chief Complaint   Patient presents with    Medication Refill       HPI: 28 y.o. female patient of Dr. Wang, new patient to me to discuss insomnia.  Has been on Klonopin for a long time, 1 mg at night.  Has never been on anything else for sleep.  Another physician that she was seeing had recommended her not take this medication on a long-term basis.  She does have coexisting anxiety and depression, stable on Wellbutrin 150 mg daily plus Lexapro 10 mg daily.  Last month has been a little bit difficult due to some situational factors but medication usually has been fairly well controlling her symptoms.  States that she has no problems going to sleep but has problems staying asleep.  Sometimes will wake up around 1:00 a.m. and cannot get back to sleep.  She has tried half a clonazepam in the past and sometimes she will use this if she knows that she may not be able to get a full night's sleep and needs to get less than 6 hr of sleep.  Otherwise, she will take a full pill nightly.  She does smoke half pack to a pack a day, has thought about quitting but because of stress levels has not yet.     Several family members with insomnia as well, on similar medications for anxiety and depression as well    Blood pressure is low normal today but patient does not feel poorly with this.  She is on 100 mg of spironolactone for acne.  Denies feeling weak, dizzy, presyncopal, chest pain, shortness of breath, palpitations, tachycardia.        Past Medical History:   Diagnosis Date    Anxiety     HSV-2 (herpes simplex virus 2) infection 2014    Irritable bowel syndrome     Smoker        Past Surgical History:   Procedure Laterality Date    CHOLECYSTECTOMY  2012    COLONOSCOPY N/A 5/2/2017    Procedure: COLONOSCOPY;  Surgeon: Bib Haq Jr., MD;   Location: [facility];  Service: Endoscopy;  Laterality: N/A;    UPPER GASTROINTESTINAL ENDOSCOPY  03/09/2017    Dr. Nam    URETERAL REIMPLANTION         Family History   Problem Relation Age of Onset    Pancreatic cancer Paternal Grandfather     Irritable bowel syndrome Mother     Bipolar disorder Father     Lupus Maternal Grandmother     Colon cancer Neg Hx     Colon polyps Neg Hx     Crohn's disease Neg Hx     Ulcerative colitis Neg Hx        Social History     Social History    Marital status: Single     Spouse name: N/A    Number of children: N/A    Years of education: N/A     Occupational History          Social History Main Topics    Smoking status: Current Every Day Smoker     Packs/day: 0.50     Years: 11.00     Types: Cigarettes     Start date: 1/1/2007    Smokeless tobacco: Never Used    Alcohol use 4.2 oz/week     7 Cans of beer per week    Drug use: No    Sexual activity: No     Other Topics Concern    Not on file     Social History Narrative    No narrative on file       ROS:  GENERAL: No fever, chills, fatigability or weight loss.  SKIN: No rashes, no itching.  HEAD: No headaches.  EYES: No visual changes  EARS: No ear pain or changes in hearing.  NOSE: No congestion or rhinorrhea.  MOUTH & THROAT: No hoarseness, change in voice, or sore throat.  NODES: Denies swollen glands.  CHEST: Denies STEIN, cyanosis, wheezing, cough and sputum production.  CARDIOVASCULAR: Denies chest pain, PND, orthopnea.  ABDOMEN: No nausea, vomiting, or changes in bowel function.  URINARY: No flank pain, dysuria or hematuria.  PERIPHERAL VASCULAR: No claudication or cyanosis.  MUSCULOSKELETAL: No joint stiffness or swelling. Denies back pain.  NEUROLOGIC: No weakness or numbness.    Vital signs reviewed  PE:   APPEARANCE: Well nourished, well developed, in no acute distress.    HEAD: Normocephalic, atraumatic.  EYES:     Conjunctivae noninjected.  NECK: Supple with no cervical  lymphadenopathy.    CHEST: Good inspiratory effort. Lungs clear to auscultation with no wheezes or crackles.  CARDIOVASCULAR: Normal S1, S2. No rubs, murmurs, or gallops.  ABDOMEN: Bowel sounds normal. Not distended. Soft. No tenderness or masses. No organomegaly.  EXTREMITIES: No edema, cyanosis, or clubbing.  PSYCHIATRIC:  Appropriate mood and affect    IMPRESSION  1. Insomnia, unspecified type    2. Major depressive disorder, recurrent, moderate    3. Generalized anxiety disorder            PLAN  Did discuss pitfalls of chronic benzodiazepine use.  I would like to see if she can try an alternate medication as she has never tried anything else for insomnia before.  Will try hydroxyzine 1st.  She was prescribed 25 mg pill that she can take up to 50 mg.  1st would advise cutting down the Klonopin half pill a day.  This is enough for sleep, she can continue this for week and then stop in transition directly over hydroxyzine 25.  If the Klonopin half pill is not enough, she can take this along with hydroxyzine 25 for 1 week and then stop Klonopin and continue on the hydroxyzine.  She may at that time increase up to 50 mg if she feels necessary.    We discussed adequate sleep hygiene measures    Discussed alternate medications as well if the hydroxyzine is not effective including amitriptyline, trazodone, low-dose Seroquel.    Advised on smoking cessation, at some point she could even consider to transition over the Wellbutrin  b.i.d. as typical smoking cessation dose.  Discussed setting quit date in the future after which point she should not fire smoke any cigarettes from then on    25 min visit greater than 50% counseling

## 2018-06-26 DIAGNOSIS — F33.1 MAJOR DEPRESSIVE DISORDER, RECURRENT, MODERATE: ICD-10-CM

## 2018-06-26 RX ORDER — ESCITALOPRAM OXALATE 10 MG/1
10 TABLET ORAL DAILY
Qty: 30 TABLET | Refills: 0 | Status: SHIPPED | OUTPATIENT
Start: 2018-06-26 | End: 2018-08-03 | Stop reason: SDUPTHER

## 2018-06-26 RX ORDER — BUPROPION HYDROCHLORIDE 150 MG/1
150 TABLET ORAL DAILY
Qty: 30 TABLET | Refills: 0 | Status: SHIPPED | OUTPATIENT
Start: 2018-06-26 | End: 2018-08-03 | Stop reason: SDUPTHER

## 2018-08-03 DIAGNOSIS — F33.1 MAJOR DEPRESSIVE DISORDER, RECURRENT, MODERATE: ICD-10-CM

## 2018-08-03 RX ORDER — BUPROPION HYDROCHLORIDE 150 MG/1
150 TABLET ORAL DAILY
Qty: 30 TABLET | Refills: 0 | Status: SHIPPED | OUTPATIENT
Start: 2018-08-03 | End: 2018-09-02

## 2018-08-03 RX ORDER — ESCITALOPRAM OXALATE 10 MG/1
10 TABLET ORAL DAILY
Qty: 30 TABLET | Refills: 0 | Status: SHIPPED | OUTPATIENT
Start: 2018-08-03 | End: 2018-09-02

## 2018-08-18 ENCOUNTER — PATIENT MESSAGE (OUTPATIENT)
Dept: FAMILY MEDICINE | Facility: CLINIC | Age: 28
End: 2018-08-18

## 2018-08-18 DIAGNOSIS — F41.1 GENERALIZED ANXIETY DISORDER: Primary | ICD-10-CM

## 2018-08-21 RX ORDER — CLONAZEPAM 1 MG/1
1 TABLET ORAL 2 TIMES DAILY
Qty: 60 TABLET | Refills: 0 | Status: SHIPPED | OUTPATIENT
Start: 2018-08-21 | End: 2018-10-25 | Stop reason: SDUPTHER

## 2018-08-29 ENCOUNTER — PATIENT MESSAGE (OUTPATIENT)
Dept: GASTROENTEROLOGY | Facility: CLINIC | Age: 28
End: 2018-08-29

## 2018-08-29 NOTE — PATIENT INSTRUCTIONS
Irritable Bowel Syndrome    Irritable bowel syndrome (IBS) is a disorder of the intestines. It is not a disease, but a group of symptoms caused by changes in the way the intestines work. It is fairly common, but the cause is not well understood.  Symptoms of IBS include:  · Abdominal pain, discomfort, and cramping  · Diarrhea  · Constipation or dry, hard stools  · Mucous stool  · Bloating  · Feeling of incomplete bowel movements  It usually results in one of 3 patterns of symptoms:  · Chronic abdominal pain and constipation  · Recurring episodes of diarrhea, with or without pain  · Alternating diarrhea and constipation  Home care  The goal of treatment is to control and relieve your symptoms, so you can lead a full and active life. There is no cure for IBS. But it can be managed.  Diet  Your diet did not cause your IBS, but it can affect it. No one diet works for everyone. Finding the best foods for you may take trial and error. Keep a food log to help find what foods made your symptoms worse. Below are some tips that may help you.  · Eat more slowly. Eat smaller amounts at a time, but more often. Remember, you can always eat more, but cannot eat less once youve eaten too much.  · High-fiber foods are complicated. While they may help relieve constipation, they can make your bloating, cramping, gas, and diarrhea worse.  · Eat less sugar.  · Try cutting out dairy products. Sometimes this helps.  · Try cutting out foods that are high in fat and fatty meats.  · You can control bloating or passing excess gas. Be careful with gassy vegetables and fruits like beans, cabbage, broccoli, and cauliflower.  · Be careful with carbonated beverages and fruit juices. They can make your bloating and diarrhea worse.  · Caffeine, alcohol, and stimulants can make symptoms worse. These include coffee, tea, sodas, energy drinks, and chocolate.  Lifestyle  · Look for factors that seem to worsen your symptoms. These include stress and  emotions.   · Although stress does not cause IBS, it may trigger flare-ups. Counseling can help you learn to handle stress. So can self-help measures like exercise, yoga, and meditation.  · Depression can occur along with IBS. Your healthcare provider may prescribe antidepressant medicine. This may help with diarrhea, constipation, and cramping, as well as with symptoms of depression.  · Smoking doesn't cause IBS, but can make the symptoms worse.  Medicines  Your healthcare provider may prescribe medicines. Take them as directed. For acute flare-ups of your illness, your provider may give you prescription medicines.  · Check with your healthcare provider before taking any medicines for diarrhea.  · Avoid anti-inflammatory medicines like ibuprofen or naproxen.  · Consider nutritional supplements. This is especially true if your diarrhea is prolonged, or you aren't eating or are losing weight  Follow-up care  Follow up with your healthcare provider, or as advised. If a stool sample was taken or cultures were done, you will be told if your treatment needs to change. You can call as directed for the results.  When to seek medical advice  Call your healthcare provider right away if any of these occur:  · Abdominal pain gets worse  · Constant abdominal pain moves to the right-lower abdomen  · You can't keep liquids down because of vomiting  · You have severe diarrhea  · You have blood (red or black color) or mucus in your stool  · You feel very weak or dizzy, faint, or have extreme thirst  · You have a fever of 100.4ºF (38.0ºC) or higher, or as directed by your healthcare provider  Date Last Reviewed: 8/31/2015  © 6456-3270 [employer]. [address] [place] [address], [place] [address]. All rights reserved. This information is not intended as a substitute for professional medical care. Always follow your healthcare professional's instructions.

## 2018-09-01 DIAGNOSIS — F33.1 MAJOR DEPRESSIVE DISORDER, RECURRENT, MODERATE: ICD-10-CM

## 2018-09-04 RX ORDER — BUPROPION HYDROCHLORIDE 150 MG/1
150 TABLET ORAL DAILY
Qty: 30 TABLET | Refills: 0 | Status: SHIPPED | OUTPATIENT
Start: 2018-09-04 | End: 2018-09-04

## 2018-09-04 RX ORDER — ESCITALOPRAM OXALATE 10 MG/1
10 TABLET ORAL DAILY
Qty: 30 TABLET | Refills: 0 | Status: SHIPPED | OUTPATIENT
Start: 2018-09-04 | End: 2018-09-04

## 2018-09-04 NOTE — TELEPHONE ENCOUNTER
Notified patient of prescription refills and need for an appointment, patient verbalized understanding and declined to schedule an office visit due to no longer taking either medication.

## 2018-09-11 ENCOUNTER — PATIENT MESSAGE (OUTPATIENT)
Dept: DERMATOLOGY | Facility: CLINIC | Age: 28
End: 2018-09-11

## 2018-09-12 ENCOUNTER — PATIENT MESSAGE (OUTPATIENT)
Dept: GASTROENTEROLOGY | Facility: CLINIC | Age: 28
End: 2018-09-12

## 2018-09-13 ENCOUNTER — PATIENT MESSAGE (OUTPATIENT)
Dept: GASTROENTEROLOGY | Facility: CLINIC | Age: 28
End: 2018-09-13

## 2018-10-11 ENCOUNTER — PATIENT MESSAGE (OUTPATIENT)
Dept: FAMILY MEDICINE | Facility: CLINIC | Age: 28
End: 2018-10-11

## 2018-10-15 ENCOUNTER — PATIENT MESSAGE (OUTPATIENT)
Dept: OBSTETRICS AND GYNECOLOGY | Facility: CLINIC | Age: 28
End: 2018-10-15

## 2018-10-15 DIAGNOSIS — Z30.09 ENCOUNTER FOR GENERAL COUNSELING AND ADVICE ON CONTRACEPTIVE MANAGEMENT: Primary | ICD-10-CM

## 2018-10-15 NOTE — TELEPHONE ENCOUNTER
Attempted to contact pt regarding appt scheduled for tomorrow 10/16, no answer, lvm to return call. Pt is coming in to switch back to depo from ocp's, pt is not due for an exam, scheduled appt for injection 10/16 at 3:30pm.

## 2018-10-16 ENCOUNTER — CLINICAL SUPPORT (OUTPATIENT)
Dept: OBSTETRICS AND GYNECOLOGY | Facility: CLINIC | Age: 28
End: 2018-10-16
Payer: COMMERCIAL

## 2018-10-16 DIAGNOSIS — Z30.42 ENCOUNTER FOR DEPO-PROVERA CONTRACEPTION: Primary | ICD-10-CM

## 2018-10-16 LAB
B-HCG UR QL: NEGATIVE
CTP QC/QA: YES

## 2018-10-16 PROCEDURE — 81025 URINE PREGNANCY TEST: CPT | Mod: S$GLB,,, | Performed by: OBSTETRICS & GYNECOLOGY

## 2018-10-16 PROCEDURE — 96372 THER/PROPH/DIAG INJ SC/IM: CPT | Mod: S$GLB,,, | Performed by: OBSTETRICS & GYNECOLOGY

## 2018-10-16 PROCEDURE — 99999 PR PBB SHADOW E&M-EST. PATIENT-LVL II: CPT | Mod: PBBFAC,,,

## 2018-10-16 RX ORDER — MEDROXYPROGESTERONE ACETATE 150 MG/ML
150 INJECTION, SUSPENSION INTRAMUSCULAR
Status: DISCONTINUED | OUTPATIENT
Start: 2018-10-16 | End: 2019-02-13

## 2018-10-16 RX ORDER — MEDROXYPROGESTERONE ACETATE 150 MG/ML
150 INJECTION, SUSPENSION INTRAMUSCULAR
Qty: 150 ML | Refills: 0 | OUTPATIENT
Start: 2018-10-16

## 2018-10-16 RX ADMIN — MEDROXYPROGESTERONE ACETATE 150 MG: 150 INJECTION, SUSPENSION INTRAMUSCULAR at 03:10

## 2018-10-16 NOTE — TELEPHONE ENCOUNTER
Pt just saw Dr. Brar for an annual in April with detailed plan for follow-up. Lets see who patient desires to continue care with.     Mimi Bowers MD, FACOG  OB/GYN  Pager: [phone]

## 2018-10-16 NOTE — PROGRESS NOTES
Administered Depo Provera 150 mg/ml @ 1543 10/16/2018  1 ml IM inj in R upper quad gluteus  Patient Tolerated well.  Patient instructed to return on 01/07/2019 for next injection.   Patient verbalized understanding.   Lot:[id number]  Exp: 05/2020  Reviewed patient pain scale, allergies, medications, preffered pharmacy, fall risk, and advance directives verbally prior to injection.     Date of last pap/visit:10/17/2016  Last Depo-Provera: 6/28/2017  UPT indicated?Yes negative  Ordering Provider: Dr. Bowers

## 2018-10-16 NOTE — TELEPHONE ENCOUNTER
Pt scheduled for depo injection today. Order for UPT placed.     Mimi Bowers MD, FACOG  OB/GYN  Pager: [phone]

## 2018-10-16 NOTE — TELEPHONE ENCOUNTER
Contacted pt to see if she has switched care to Dr. Brar, pt states she only saw Dr. Brar due to Dr. Bowers being out. Pt states she would like to continue seeing Dr. Bowers.

## 2018-10-25 ENCOUNTER — PATIENT MESSAGE (OUTPATIENT)
Dept: FAMILY MEDICINE | Facility: CLINIC | Age: 28
End: 2018-10-25

## 2018-10-25 DIAGNOSIS — F41.1 GENERALIZED ANXIETY DISORDER: ICD-10-CM

## 2018-10-25 RX ORDER — CLONAZEPAM 1 MG/1
TABLET ORAL
Qty: 60 TABLET | Refills: 0 | Status: SHIPPED | OUTPATIENT
Start: 2018-10-25 | End: 2019-01-02 | Stop reason: SDUPTHER

## 2019-01-02 DIAGNOSIS — F41.1 GENERALIZED ANXIETY DISORDER: ICD-10-CM

## 2019-01-02 RX ORDER — CLONAZEPAM 1 MG/1
1 TABLET ORAL 2 TIMES DAILY
Qty: 60 TABLET | Refills: 0 | Status: SHIPPED | OUTPATIENT
Start: 2019-01-02 | End: 2019-01-30 | Stop reason: SDUPTHER

## 2019-01-02 NOTE — TELEPHONE ENCOUNTER
----- Message from Laurie Carbajal sent at 1/2/2019 11:09 AM CST -----  Contact: [phone]/self  Patient requesting to speak with you concerning scheduling an appointment on 1/8/2019 for a bump on her leg.  Please call and advise

## 2019-01-02 NOTE — TELEPHONE ENCOUNTER
Spoke to pt and states that she has a bump on her leg. Informed pt that Dr. Wang had no available appts for 1/8. Offered pt an appt on 1/3, but pt declined. Pt stated that she will call back.

## 2019-01-09 ENCOUNTER — OFFICE VISIT (OUTPATIENT)
Dept: FAMILY MEDICINE | Facility: CLINIC | Age: 29
End: 2019-01-09
Payer: COMMERCIAL

## 2019-01-09 VITALS
DIASTOLIC BLOOD PRESSURE: 60 MMHG | OXYGEN SATURATION: 97 % | HEIGHT: 67 IN | HEART RATE: 88 BPM | SYSTOLIC BLOOD PRESSURE: 88 MMHG | BODY MASS INDEX: 20.5 KG/M2 | WEIGHT: 130.63 LBS | RESPIRATION RATE: 18 BRPM | TEMPERATURE: 99 F

## 2019-01-09 DIAGNOSIS — Z72.0 TOBACCO ABUSE: ICD-10-CM

## 2019-01-09 DIAGNOSIS — R06.2 WHEEZING: ICD-10-CM

## 2019-01-09 DIAGNOSIS — L72.3 SEBACEOUS CYST: Primary | ICD-10-CM

## 2019-01-09 PROCEDURE — 99214 OFFICE O/P EST MOD 30 MIN: CPT | Mod: S$GLB,,, | Performed by: INTERNAL MEDICINE

## 2019-01-09 PROCEDURE — 99214 PR OFFICE/OUTPT VISIT, EST, LEVL IV, 30-39 MIN: ICD-10-PCS | Mod: S$GLB,,, | Performed by: INTERNAL MEDICINE

## 2019-01-09 PROCEDURE — 94640 AIRWAY INHALATION TREATMENT: CPT | Mod: S$GLB,,, | Performed by: INTERNAL MEDICINE

## 2019-01-09 PROCEDURE — 94640 PR INHAL RX, AIRWAY OBST/DX SPUTUM INDUCT: ICD-10-PCS | Mod: S$GLB,,, | Performed by: INTERNAL MEDICINE

## 2019-01-09 RX ORDER — PREDNISONE 20 MG/1
40 TABLET ORAL DAILY
Qty: 6 TABLET | Refills: 0 | Status: SHIPPED | OUTPATIENT
Start: 2019-01-09 | End: 2019-01-12

## 2019-01-09 RX ORDER — VARENICLINE TARTRATE 0.5 (11)-1
KIT ORAL
Qty: 1 PACKAGE | Refills: 0 | Status: SHIPPED | OUTPATIENT
Start: 2019-01-09 | End: 2019-08-19 | Stop reason: SDUPTHER

## 2019-01-09 RX ORDER — ALBUTEROL SULFATE 90 UG/1
2 AEROSOL, METERED RESPIRATORY (INHALATION) EVERY 6 HOURS PRN
Qty: 18 G | Refills: 0 | Status: SHIPPED | OUTPATIENT
Start: 2019-01-09 | End: 2019-02-13

## 2019-01-09 RX ORDER — NORETHINDRONE ACETATE AND ETHINYL ESTRADIOL .02; 1 MG/1; MG/1
1 TABLET ORAL DAILY
Refills: 11 | COMMUNITY
Start: 2019-01-03 | End: 2019-02-13

## 2019-01-09 RX ORDER — VARENICLINE TARTRATE 1 MG/1
1 TABLET, FILM COATED ORAL 2 TIMES DAILY
Qty: 60 TABLET | Refills: 3 | Status: SHIPPED | OUTPATIENT
Start: 2019-01-09 | End: 2019-08-19 | Stop reason: SDUPTHER

## 2019-01-09 RX ORDER — ALBUTEROL SULFATE 0.83 MG/ML
2.5 SOLUTION RESPIRATORY (INHALATION)
Status: COMPLETED | OUTPATIENT
Start: 2019-01-09 | End: 2019-01-09

## 2019-01-09 RX ADMIN — ALBUTEROL SULFATE 2.5 MG: 0.83 SOLUTION RESPIRATORY (INHALATION) at 08:01

## 2019-01-09 NOTE — PROGRESS NOTES
Subjective:       Patient ID: Stephanie Harding is a 28 y.o. female.       Medication List           Accurate as of 1/9/19  8:34 AM. If you have any questions, ask your nurse or doctor.               START taking these medications    albuterol 90 mcg/actuation inhaler  Commonly known as:  PROVENTIL HFA  Inhale 2 puffs into the lungs every 6 (six) hours as needed for Wheezing. Rescue  Started by:  Anabelle Becerra DO     predniSONE 20 MG tablet  Commonly known as:  DELTASONE  Take 2 tablets (40 mg total) by mouth once daily. for 3 days  Started by:  Anabelle Becerra DO     * varenicline 0.5 mg (11)- 1 mg (42) tablet  Commonly known as:  CHANTIX STARTING MONTH BOX  Take one 0.5mg tab by mouth once daily X3 days,then increase to one 0.5mg tab twice daily X4 days,then increase to one 1mg tab twice daily  Started by:  Anabelle Becerra DO     * varenicline 1 mg Tab  Commonly known as:  CHANTIX  Take 1 tablet (1 mg total) by mouth 2 (two) times daily.  Started by:  Anabelle Becerra DO         * This list has 2 medication(s) that are the same as other medications prescribed for you. Read the directions carefully, and ask your doctor or other care provider to review them with you.            CONTINUE taking these medications    clonazePAM 1 MG tablet  Commonly known as:  KLONOPIN  Take 1 tablet (1 mg total) by mouth 2 (two) times daily.     hydrOXYzine HCl 25 MG tablet  Commonly known as:  ATARAX  1-2 tabs po qhs prn insomnia     norethindrone-ethinyl estradiol 1-20 mg-mcg per tablet  Commonly known as:  MICROGESTIN 1/20     spironolactone 100 MG tablet  Commonly known as:  ALDACTONE  1 po qday           Where to Get Your Medications      These medications were sent to [facility]/pharmacy #[id number] - [place], [place] - [address] [facility] [address]  [address] [address], [facility] [address]    Hours:  24-hours Phone:  [phone]   · albuterol 90 mcg/actuation inhaler  · predniSONE 20 MG tablet     You can get these medications from any pharmacy    Bring a  paper prescription for each of these medications  · varenicline 0.5 mg (11)- 1 mg (42) tablet  · varenicline 1 mg Tab         Chief Complaint: Cyst on on back of left leg (It has been coming and going for about three months; has some in groin area the last few months)  She presents with a lump on the back of her left leg for 3 months that changes in size and occasional will drain.  She says it is painful because she sits down or puts pressure on it. It is not red or swollen today.  No drainage today but she says her boyfriend drained material out of it for the last 3 days.  No fevers. No injury or bites.      She does complain of wheezing for about one week. She continues to smoke.  She has a mild cough. No cold symptoms. No sore throat. No ear pain.  She denies shortness of breath.  She is interested in stopping smoking.     Review of Systems   Constitutional: Negative for activity change, appetite change, chills, fatigue, fever and unexpected weight change.   HENT: Negative for congestion, ear discharge, ear pain, hearing loss, mouth sores, postnasal drip, rhinorrhea, sinus pressure, sore throat and trouble swallowing.    Eyes: Negative for pain, discharge, redness and visual disturbance.   Respiratory: Positive for cough and wheezing. Negative for chest tightness and shortness of breath.    Cardiovascular: Negative for chest pain and palpitations.   Gastrointestinal: Negative for abdominal pain, blood in stool, constipation, diarrhea, nausea and vomiting.   Endocrine: Negative for polydipsia and polyuria.   Genitourinary: Negative for difficulty urinating, dysuria, hematuria and menstrual problem.   Musculoskeletal: Negative for arthralgias, joint swelling, neck pain and neck stiffness.   Skin: Negative for rash.   Neurological: Negative for weakness and headaches.   Hematological: Negative for adenopathy.   Psychiatric/Behavioral: Negative for confusion and dysphoric mood.       Objective:      Vitals:     "01/09/19 0755   BP: (!) 88/60   Pulse: 88   Resp: 18   Temp: 98.6 °F (37 °C)   TempSrc: Oral   SpO2: 97%   Weight: 59.2 kg (130 lb 9.6 oz)   Height: 5' 7" (1.702 m)     Body mass index is 20.45 kg/m².  Physical Exam    General appearance: alert, no acute distress  Head: atraumatic  Eyes: PERRL, EMOI, normal conjunctiva, no drainage  Ears: tm normal with good visualization of landmarks, no erythema or pus, canals normal, external ear normal  Nose: normal mucosa, no polyps or sores, no rhinorrhea  Throat: no erythema, no exudates, tonsils appear normal  Mouth: no sores or lesion, moist mucous membranes  Neck: supple, FROM, no masses, no tenderness  Lymph: no posterior or cervical adenopathy  Lungs: no distress, no retractions, course breath sound with wheezing in both lung fields, no rales, no rhonchi----given albuterol and post treatment exam shows clear to ascultation, no wheezing.   Heart:: Regular rate and rhythm, no murmur  Abdomen: soft, non-tender, no guarding, no rebound, no peritoneal signs, bowel sounds normal, no hepatosplenomegaly, no masses  Skin: no rashes, 1 cm x 0.5 cm cystic lesion behind left leg, no drainage, but tender to palpation, no erythema or swelling  Perfusion: good capillary refill, normal pulses      Assessment:       1. Sebaceous cyst    2. Wheezing    3. Tobacco abuse        Plan:       Sebaceous cyst  No evidence of infection today but the cyst is painful when she sits and needs to be removed. Referral made to surgery.   -     Ambulatory referral to General Surgery    Wheezing  Great response to albuterol. She feels better and could take a deep breath. Will treat with steroids for 3 days and albuterol MDI given to use qid PRN.  Advised of the signs of worsening to return to clinic.   -     albuterol nebulizer solution 2.5 mg  -     predniSONE (DELTASONE) 20 MG tablet; Take 2 tablets (40 mg total) by mouth once daily. for 3 days  Dispense: 6 tablet; Refill: 0  -     albuterol (PROVENTIL " HFA) 90 mcg/actuation inhaler; Inhale 2 puffs into the lungs every 6 (six) hours as needed for Wheezing. Rescue  Dispense: 18 g; Refill: 0    Tobacco abuse  Discussed that she needs to stop smoking and she is willing to try.  Chantix given today.   -     varenicline (CHANTIX STARTING MONTH BOX) 0.5 mg (11)- 1 mg (42) tablet; Take one 0.5mg tab by mouth once daily X3 days,then increase to one 0.5mg tab twice daily X4 days,then increase to one 1mg tab twice daily  Dispense: 1 Package; Refill: 0  -     varenicline (CHANTIX) 1 mg Tab; Take 1 tablet (1 mg total) by mouth 2 (two) times daily.  Dispense: 60 tablet; Refill: 3    Follow-up if symptoms worsen or fail to improve.

## 2019-01-12 ENCOUNTER — PATIENT MESSAGE (OUTPATIENT)
Dept: FAMILY MEDICINE | Facility: CLINIC | Age: 29
End: 2019-01-12

## 2019-01-14 ENCOUNTER — PATIENT MESSAGE (OUTPATIENT)
Dept: FAMILY MEDICINE | Facility: CLINIC | Age: 29
End: 2019-01-14

## 2019-01-14 RX ORDER — AMOXICILLIN AND CLAVULANATE POTASSIUM 875; 125 MG/1; MG/1
1 TABLET, FILM COATED ORAL EVERY 12 HOURS
Qty: 20 TABLET | Refills: 0 | Status: SHIPPED | OUTPATIENT
Start: 2019-01-14 | End: 2019-02-13

## 2019-01-17 ENCOUNTER — OFFICE VISIT (OUTPATIENT)
Dept: SURGERY | Facility: CLINIC | Age: 29
End: 2019-01-17
Payer: COMMERCIAL

## 2019-01-17 VITALS
WEIGHT: 131.19 LBS | HEIGHT: 67 IN | SYSTOLIC BLOOD PRESSURE: 117 MMHG | RESPIRATION RATE: 16 BRPM | TEMPERATURE: 98 F | BODY MASS INDEX: 20.59 KG/M2 | HEART RATE: 108 BPM | DIASTOLIC BLOOD PRESSURE: 71 MMHG

## 2019-01-17 DIAGNOSIS — R22.9 SUBCUTANEOUS MASS: Primary | ICD-10-CM

## 2019-01-17 PROCEDURE — 11403 EXC TR-EXT B9+MARG 2.1-3CM: CPT | Mod: 51,,, | Performed by: SURGERY

## 2019-01-17 PROCEDURE — 99204 OFFICE O/P NEW MOD 45 MIN: CPT | Mod: 25,S$GLB,, | Performed by: SURGERY

## 2019-01-17 PROCEDURE — 99204 PR OFFICE/OUTPT VISIT, NEW, LEVL IV, 45-59 MIN: ICD-10-PCS | Mod: 25,S$GLB,, | Performed by: SURGERY

## 2019-01-17 PROCEDURE — 11404 EXC TR-EXT B9+MARG 3.1-4 CM: CPT | Mod: S$GLB,,, | Performed by: SURGERY

## 2019-01-17 PROCEDURE — 11403 PR EXC SKIN BENIG 2.1-3 CM TRUNK,ARM,LEG: ICD-10-PCS | Mod: 51,,, | Performed by: SURGERY

## 2019-01-17 PROCEDURE — 88305 TISSUE EXAM BY PATHOLOGIST: CPT | Performed by: PATHOLOGY

## 2019-01-17 PROCEDURE — 88305 TISSUE SPECIMEN TO PATHOLOGY, SURGERY: ICD-10-PCS | Mod: 26,,, | Performed by: PATHOLOGY

## 2019-01-17 PROCEDURE — 88305 TISSUE EXAM BY PATHOLOGIST: CPT | Mod: 26,,, | Performed by: PATHOLOGY

## 2019-01-17 PROCEDURE — 99999 PR PBB SHADOW E&M-EST. PATIENT-LVL V: CPT | Mod: PBBFAC,,, | Performed by: SURGERY

## 2019-01-17 PROCEDURE — 11404 PR EXC SKIN BENIG 3.1-4 CM TRUNK,ARM,LEG: ICD-10-PCS | Mod: S$GLB,,, | Performed by: SURGERY

## 2019-01-17 PROCEDURE — 99999 PR PBB SHADOW E&M-EST. PATIENT-LVL V: ICD-10-PCS | Mod: PBBFAC,,, | Performed by: SURGERY

## 2019-01-17 RX ORDER — HYDROCODONE BITARTRATE AND ACETAMINOPHEN 7.5; 325 MG/1; MG/1
1 TABLET ORAL EVERY 6 HOURS PRN
Qty: 15 TABLET | Refills: 0 | Status: SHIPPED | OUTPATIENT
Start: 2019-01-17 | End: 2019-01-23 | Stop reason: SDUPTHER

## 2019-01-17 NOTE — LETTER
January 17, 2019      Anabelle Becerra DO  [address] [address]  [address]  [facility] [address]           [facility]  [address] [facility] [address] [place] [address]  Phone: [phone]          Patient: Stephanie Harding   MR Number: [id number]   YOB: 1990   Date of Visit: 1/17/2019       Dear Dr. Anabelle Becerra:    Thank you for referring Stephanie Harding to me for evaluation. Attached you will find relevant portions of my assessment and plan of care.    If you have questions, please do not hesitate to call me. I look forward to following Stephanie Harding along with you.    Sincerely,    Homa Dubois MD    Enclosure  CC:  No Recipients    If you would like to receive this communication electronically, please contact [email or web address] or [phone] to request more information on [employer] Link access.    For providers and/or their staff who would like to refer a patient to [facility], please contact us through our one-stop-shop provider referral line, Nic Palma, at [phone].    If you feel you have received this communication in error or would no longer like to receive these types of communications, please e-mail [email or web address]

## 2019-01-17 NOTE — PROGRESS NOTES
Initial Consult    Chief Complaint   Patient presents with    Cyst       History of Present Illness:  Patient is a 28 y.o. female who is referred for left sided cyst removal.  She reports having two.  The one she is most concerned about is in her left inguinal region and has been present for about 1 month.  It is red swollen and painful.  The other is on the back of her left leg and she has had it for 4 months.  It is bothersome sometimes growing and shrinking.  She thinks both are cysts and the inguinal one she worries is infection.    Review of patient's allergies indicates:  No Known Allergies    Current Outpatient Medications   Medication Sig Dispense Refill    clonazePAM (KLONOPIN) 1 MG tablet Take 1 tablet (1 mg total) by mouth 2 (two) times daily. 60 tablet 0    norethindrone-ethinyl estradiol (MICROGESTIN 1/20) 1-20 mg-mcg per tablet Take 1 tablet by mouth once daily.  11    spironolactone (ALDACTONE) 100 MG tablet 1 po qday 30 tablet 3    albuterol (PROVENTIL HFA) 90 mcg/actuation inhaler Inhale 2 puffs into the lungs every 6 (six) hours as needed for Wheezing. Rescue 18 g 0    amoxicillin-clavulanate 875-125mg (AUGMENTIN) 875-125 mg per tablet Take 1 tablet by mouth every 12 (twelve) hours. 20 tablet 0    HYDROcodone-acetaminophen (NORCO) 7.5-325 mg per tablet Take 1 tablet by mouth every 6 (six) hours as needed for Pain. 15 tablet 0    hydrOXYzine HCl (ATARAX) 25 MG tablet 1-2 tabs po qhs prn insomnia 60 tablet 11    varenicline (CHANTIX STARTING MONTH BOX) 0.5 mg (11)- 1 mg (42) tablet Take one 0.5mg tab by mouth once daily X3 days,then increase to one 0.5mg tab twice daily X4 days,then increase to one 1mg tab twice daily 1 Package 0    varenicline (CHANTIX) 1 mg Tab Take 1 tablet (1 mg total) by mouth 2 (two) times daily. 60 tablet 3     Current Facility-Administered Medications   Medication Dose Route Frequency Provider Last Rate Last Dose    medroxyPROGESTERone (DEPO-PROVERA) injection 150  mg  150 mg Intramuscular Q90 Days Mimi Bowers MD   150 mg at 10/16/18 1543       Past Medical History:   Diagnosis Date    Anxiety     HSV-2 (herpes simplex virus 2) infection 2014    Irritable bowel syndrome     Smoker      Past Surgical History:   Procedure Laterality Date    CHOLECYSTECTOMY  2012    COLONOSCOPY N/A 5/2/2017    Performed by Bib Haq Jr., [facility] at [facility]    ESOPHAGOGASTRODUODENOSCOPY (EGD) N/A 3/9/2017    [facility]    UPPER GASTROINTESTINAL ENDOSCOPY  03/09/2017    Dr. Nam    URETERAL REIMPLANTION       Family History   Problem Relation Age of Onset    Pancreatic cancer Paternal Grandfather     Irritable bowel syndrome Mother     Bipolar disorder Father     Lupus Maternal Grandmother     Colon cancer Neg Hx     Colon polyps Neg Hx     Crohn's disease Neg Hx     Ulcerative colitis Neg Hx      Social History     Tobacco Use    Smoking status: Current Every Day Smoker     Packs/day: 0.50     Years: 11.00     Pack years: 5.50     Types: Cigarettes     Start date: 1/1/2007    Smokeless tobacco: Never Used   Substance Use Topics    Alcohol use: Yes     Alcohol/week: 4.2 oz     Types: 7 Cans of beer per week    Drug use: No            Review of Systems:  Review of Systems   Constitutional: Negative for chills, diaphoresis and fever.   HENT: Negative for congestion, sinus pressure, sneezing, sore throat, tinnitus and voice change.    Eyes: Negative for pain, redness and itching.   Respiratory: Negative for apnea, cough, choking, chest tightness, shortness of breath, wheezing and stridor.    Cardiovascular: Negative for chest pain, palpitations and leg swelling.   Gastrointestinal: Negative for abdominal pain, anal bleeding, constipation, diarrhea, nausea and vomiting.   Endocrine: Negative for cold intolerance and heat intolerance.   Genitourinary: Negative for difficulty urinating and dysuria.   Musculoskeletal:  "Negative for arthralgias, back pain and gait problem.   Skin: Positive for color change. Negative for rash and wound.   Allergic/Immunologic: Negative for environmental allergies and food allergies.   Neurological: Negative for dizziness, light-headedness and headaches.   Hematological: Negative for adenopathy. Does not bruise/bleed easily.   Psychiatric/Behavioral: Negative for agitation and confusion.       Physical:     Vital Signs (Most Recent)  Temp: 98.1 °F (36.7 °C) (01/17/19 1302)  Pulse: 108 (01/17/19 1302)  Resp: 16 (01/17/19 1302)  BP: 117/71 (01/17/19 1302)  5' 7" (1.702 m)  59.5 kg (131 lb 2.8 oz)     Physical Exam:  Physical Exam   Constitutional: She is oriented to person, place, and time. She appears well-developed and well-nourished. No distress.   HENT:   Head: Normocephalic and atraumatic.   Mouth/Throat: No oropharyngeal exudate.   Eyes: Conjunctivae and EOM are normal. Pupils are equal, round, and reactive to light. No scleral icterus.   Neck: Normal range of motion. Neck supple. No JVD present. No tracheal deviation present. No thyromegaly present.   Cardiovascular: Normal rate, regular rhythm and normal heart sounds. Exam reveals no gallop and no friction rub.   No murmur heard.  Pulmonary/Chest: Effort normal and breath sounds normal. No stridor. No respiratory distress. She has no wheezes. She has no rales. She exhibits no tenderness.   Abdominal: Soft. Bowel sounds are normal. She exhibits no distension and no mass. There is no tenderness. There is no rebound and no guarding.   Musculoskeletal: Normal range of motion. She exhibits no edema or tenderness.   Lymphadenopathy:     She has no cervical adenopathy.   Neurological: She is alert and oriented to person, place, and time. No cranial nerve deficit.   Skin: Skin is warm, dry and intact. Lesion noted. No rash noted. She is not diaphoretic. There is erythema.        Psychiatric: She has a normal mood and affect. Her behavior is normal. "   Nursing note and vitals reviewed.      ASSESSMENT/PLAN:        1. Subcutaneous mass  Tissue Specimen To Pathology, Surgery    Tissue Specimen To Pathology, Surgery    removal of both in minors today.  She has antibiotics already.  Give rx for pain meds.       Plan:  Stephanie Harding has two masses likely sebaceous cysts.   Will remove as per her request.  Risks and benefits of procedure including scarring and recurrence explained, informed consent obtained.

## 2019-01-18 NOTE — PROCEDURES
"Exc, Cyst x 2  Date/Time: 1/17/2019 7:55 PM  Performed by: Homa Dubois MD  Authorized by: Homa Dubois MD     Consent Done?:  Yes (Written)  Time out: Immediately prior to procedure a "time out" was called to verify the correct patient, procedure, equipment, support staff and site/side marked as required.      STAFF:  Assistants?: No    INDICATIONS:cyst and abscess  LOCATION:  Body area:  Upper leg / knee (left inguinal region)left    PREP:Position:  Supine    ANESTHESIA:  Anesthesia:  Local infiltration  Local anesthetic:  Lidocaine 1% with epinephrine    PROCEDURE DETAILS:  Excision type:  Skin  Malignancy:  Benign  Excision size (cm):  3  Scalpel size:  15  Incision type:  Elliptical  Specimens?: Yes    Specimens submitted to pathology.  Hemostasis was obtained.  Estimated blood loss (cc):  10  Wound closure:  Simple  Sutures:  4-0 Monocryl and 3-0 Vicryl  Sterile dressings:  Steri-strips  Needle, instrument, and sponge counts were correct.  Patient tolerated the procedure well with no immediate complications.  Patient was discharged and will follow up for wound check and pathology results. and Patient was discharged and will follow up for wound check.        "

## 2019-01-18 NOTE — PROCEDURES
"Exc, Cyst  Date/Time: 1/17/2019 7:57 PM  Performed by: Homa Dubois MD  Authorized by: Homa Dubois MD     Consent Done?:  Yes (Verbal)  Time out: Immediately prior to procedure a "time out" was called to verify the correct patient, procedure, equipment, support staff and site/side marked as required.      STAFF:  Assistants?: No    INDICATIONS:cyst  LOCATION:  Body area:  Upper leg / knee (posterior left leg)left    PREP:  Patient was prepped and draped in the normal sterile fashion.Position:  Supine    ANESTHESIA:  Anesthesia:  Local infiltration  Local anesthetic:  Lidocaine 1% with epinephrine    PROCEDURE DETAILS:  Excision type:  Skin  Malignancy:  Benign  Excision size (cm):  3  Scalpel size:  15  Incision type:  Elliptical  Specimens?: Yes    Specimens submitted to pathology.  Hemostasis was obtained.  Wound closure:  Simple  Wound repair size (cm):  3  Sutures:  4-0 Monocryl and 3-0 Vicryl  Sterile dressings:  Steri-strips and Tegaderm  Post-op diagnosis: Same as pre-op diagnosis.  Needle, instrument, and sponge counts were correct.  Patient tolerated the procedure well with no immediate complications.  Post-operative instructions were provided for the patient.  Patient was discharged and will follow up for wound check and pathology results. and Patient was discharged and will follow up for wound check.      "

## 2019-01-22 ENCOUNTER — PATIENT MESSAGE (OUTPATIENT)
Dept: BARIATRICS | Facility: CLINIC | Age: 29
End: 2019-01-22

## 2019-01-23 ENCOUNTER — TELEPHONE (OUTPATIENT)
Dept: BARIATRICS | Facility: CLINIC | Age: 29
End: 2019-01-23

## 2019-01-23 DIAGNOSIS — L70.0 ACNE VULGARIS: ICD-10-CM

## 2019-01-23 RX ORDER — SPIRONOLACTONE 100 MG/1
TABLET, FILM COATED ORAL
Qty: 30 TABLET | Refills: 3 | Status: SHIPPED | OUTPATIENT
Start: 2019-01-23 | End: 2020-03-03 | Stop reason: SDUPTHER

## 2019-01-23 RX ORDER — HYDROCODONE BITARTRATE AND ACETAMINOPHEN 7.5; 325 MG/1; MG/1
1 TABLET ORAL EVERY 6 HOURS PRN
Qty: 15 TABLET | Refills: 0 | Status: SHIPPED | OUTPATIENT
Start: 2019-01-23 | End: 2019-01-30

## 2019-01-23 NOTE — TELEPHONE ENCOUNTER
----- Message from Jono Way sent at 1/23/2019  8:51 AM CST -----  Contact: Patient  Type:  Advise and RX Refill Request    Patient is requesting to speak with the office because her steri strips came off earlier than was mentioned. Please advise    Who Called:  Patient  Refill or New Rx:  Refill  RX Name and Strength:  HYDROcodone-acetaminophen (NORCO) 7.5-325 mg per tablet  How is the patient currently taking it? (ex. 1XDay):  Take 1 tablet by mouth every 6 (six) hours as needed for Pain  Preferred Pharmacy with phone number:    [id number] - [place], [place] - [address] [facility] [address]  [address] [address]  Phone: [phone] Fax: [phone]  Local or Mail Order:  Local  Ordering Provider:  Silvino Hernández Call Back Number:  [phone]

## 2019-01-23 NOTE — TELEPHONE ENCOUNTER
called and spoke to pt about her steri strips and explained it was okay they came off to keep site clean and dry. Pt understood. Also let her know that her Norco is going to be called in.

## 2019-01-30 ENCOUNTER — OFFICE VISIT (OUTPATIENT)
Dept: BARIATRICS | Facility: CLINIC | Age: 29
End: 2019-01-30
Payer: COMMERCIAL

## 2019-01-30 VITALS
TEMPERATURE: 98 F | HEART RATE: 99 BPM | RESPIRATION RATE: 16 BRPM | WEIGHT: 134.81 LBS | SYSTOLIC BLOOD PRESSURE: 103 MMHG | DIASTOLIC BLOOD PRESSURE: 67 MMHG | HEIGHT: 67 IN | BODY MASS INDEX: 21.16 KG/M2

## 2019-01-30 DIAGNOSIS — L72.9 CYST OF SKIN: Primary | ICD-10-CM

## 2019-01-30 DIAGNOSIS — F41.1 GENERALIZED ANXIETY DISORDER: ICD-10-CM

## 2019-01-30 PROCEDURE — 99213 PR OFFICE/OUTPT VISIT, EST, LEVL III, 20-29 MIN: ICD-10-PCS | Mod: S$GLB,,, | Performed by: SURGERY

## 2019-01-30 PROCEDURE — 99999 PR PBB SHADOW E&M-EST. PATIENT-LVL IV: CPT | Mod: PBBFAC,,, | Performed by: SURGERY

## 2019-01-30 PROCEDURE — 99213 OFFICE O/P EST LOW 20 MIN: CPT | Mod: S$GLB,,, | Performed by: SURGERY

## 2019-01-30 PROCEDURE — 99999 PR PBB SHADOW E&M-EST. PATIENT-LVL IV: ICD-10-PCS | Mod: PBBFAC,,, | Performed by: SURGERY

## 2019-01-30 RX ORDER — HYDROCODONE BITARTRATE AND ACETAMINOPHEN 5; 325 MG/1; MG/1
1 TABLET ORAL EVERY 6 HOURS PRN
Qty: 15 TABLET | Refills: 0 | Status: SHIPPED | OUTPATIENT
Start: 2019-01-30 | End: 2019-02-13

## 2019-01-31 RX ORDER — CLONAZEPAM 1 MG/1
TABLET ORAL
Qty: 60 TABLET | Refills: 0 | Status: SHIPPED | OUTPATIENT
Start: 2019-01-31 | End: 2019-05-06 | Stop reason: SDUPTHER

## 2019-01-31 NOTE — PROGRESS NOTES
Went to [place].  Buttock wound has been painful and irritating.  Inguinal wound no problems    Vitals:    01/30/19 1610   BP: 103/67   Pulse: 99   Resp: 16   Temp: 98.3 °F (36.8 °C)       Left inguinal incision well healed  Left buttock incision dehisced with overlying scab, no signs of infection    A/P    Cyst excision x 2 with wound dehiscence on buttocks    She reports the buttock wound was rubbing on her jeans and opened.  Continue daily cleaning with soap and water.  Keep wound bandage, rtc in 1-2 weeks for wound check of buttock wound

## 2019-02-11 ENCOUNTER — PATIENT MESSAGE (OUTPATIENT)
Dept: BARIATRICS | Facility: CLINIC | Age: 29
End: 2019-02-11

## 2019-02-13 ENCOUNTER — NURSE TRIAGE (OUTPATIENT)
Dept: ADMINISTRATIVE | Facility: CLINIC | Age: 29
End: 2019-02-13

## 2019-02-13 PROBLEM — F17.200 SMOKER: Status: ACTIVE | Noted: 2019-02-13

## 2019-02-13 PROBLEM — N12 PYELONEPHRITIS: Status: ACTIVE | Noted: 2019-02-13

## 2019-02-13 PROBLEM — N12 PYELONEPHRITIS: Status: RESOLVED | Noted: 2019-02-13 | Resolved: 2019-02-13

## 2019-02-13 PROBLEM — N10 ACUTE PYELONEPHRITIS: Status: ACTIVE | Noted: 2019-02-13

## 2019-02-13 NOTE — TELEPHONE ENCOUNTER
Reason for Disposition   Patient wants to be seen    Protocols used: ST FLANK PAIN-A-OH    Ms. Harding states she is experiencing flank pain and urinary frequency. Patient advised to go to urgent care.

## 2019-02-14 PROBLEM — N12 PYELONEPHRITIS: Status: ACTIVE | Noted: 2019-02-14

## 2019-02-19 ENCOUNTER — PATIENT MESSAGE (OUTPATIENT)
Dept: FAMILY MEDICINE | Facility: CLINIC | Age: 29
End: 2019-02-19

## 2019-02-20 ENCOUNTER — OFFICE VISIT (OUTPATIENT)
Dept: FAMILY MEDICINE | Facility: CLINIC | Age: 29
End: 2019-02-20
Payer: COMMERCIAL

## 2019-02-20 VITALS
HEIGHT: 67 IN | BODY MASS INDEX: 21.09 KG/M2 | SYSTOLIC BLOOD PRESSURE: 92 MMHG | TEMPERATURE: 98 F | OXYGEN SATURATION: 99 % | DIASTOLIC BLOOD PRESSURE: 68 MMHG | HEART RATE: 98 BPM | WEIGHT: 134.38 LBS

## 2019-02-20 DIAGNOSIS — N12 PYELONEPHRITIS: Primary | ICD-10-CM

## 2019-02-20 DIAGNOSIS — R06.2 WHEEZING: ICD-10-CM

## 2019-02-20 LAB
BILIRUB SERPL-MCNC: NEGATIVE MG/DL
BLOOD URINE, POC: 250
COLOR, POC UA: ABNORMAL
GLUCOSE UR QL STRIP: NORMAL
KETONES UR QL STRIP: NEGATIVE
LEUKOCYTE ESTERASE URINE, POC: ABNORMAL
NITRITE, POC UA: NEGATIVE
PH, POC UA: 5
PROTEIN, POC: NEGATIVE
SPECIFIC GRAVITY, POC UA: 1.01
UROBILINOGEN, POC UA: NORMAL

## 2019-02-20 PROCEDURE — 99214 PR OFFICE/OUTPT VISIT, EST, LEVL IV, 30-39 MIN: ICD-10-PCS | Mod: 25,S$GLB,, | Performed by: INTERNAL MEDICINE

## 2019-02-20 PROCEDURE — 87086 URINE CULTURE/COLONY COUNT: CPT

## 2019-02-20 PROCEDURE — 81002 POCT URINE DIPSTICK WITHOUT MICROSCOPE: ICD-10-PCS | Mod: S$GLB,,, | Performed by: INTERNAL MEDICINE

## 2019-02-20 PROCEDURE — 99214 OFFICE O/P EST MOD 30 MIN: CPT | Mod: 25,S$GLB,, | Performed by: INTERNAL MEDICINE

## 2019-02-20 PROCEDURE — 96372 PR INJECTION,THERAP/PROPH/DIAG2ST, IM OR SUBCUT: ICD-10-PCS | Mod: S$GLB,,, | Performed by: INTERNAL MEDICINE

## 2019-02-20 PROCEDURE — 87491 CHLMYD TRACH DNA AMP PROBE: CPT

## 2019-02-20 PROCEDURE — 81002 URINALYSIS NONAUTO W/O SCOPE: CPT | Mod: S$GLB,,, | Performed by: INTERNAL MEDICINE

## 2019-02-20 PROCEDURE — 96372 THER/PROPH/DIAG INJ SC/IM: CPT | Mod: S$GLB,,, | Performed by: INTERNAL MEDICINE

## 2019-02-20 RX ORDER — METHYLPREDNISOLONE 4 MG/1
TABLET ORAL
Qty: 1 PACKAGE | Refills: 0 | Status: SHIPPED | OUTPATIENT
Start: 2019-02-20 | End: 2019-05-06

## 2019-02-20 RX ORDER — CEFDINIR 300 MG/1
300 CAPSULE ORAL 2 TIMES DAILY
Qty: 20 CAPSULE | Refills: 0 | Status: SHIPPED | OUTPATIENT
Start: 2019-02-20 | End: 2019-03-02

## 2019-02-20 RX ORDER — CEFTRIAXONE 1 G/1
1 INJECTION, POWDER, FOR SOLUTION INTRAMUSCULAR; INTRAVENOUS
Status: COMPLETED | OUTPATIENT
Start: 2019-02-20 | End: 2019-02-20

## 2019-02-20 RX ADMIN — CEFTRIAXONE 1 G: 1 INJECTION, POWDER, FOR SOLUTION INTRAMUSCULAR; INTRAVENOUS at 08:02

## 2019-02-20 NOTE — PROGRESS NOTES
Subjective:       Patient ID: Stephanie Harding is a 29 y.o. female.    Medication List with Changes/Refills   New Medications    CEFDINIR (OMNICEF) 300 MG CAPSULE    Take 1 capsule (300 mg total) by mouth 2 (two) times daily. for 10 days    METHYLPREDNISOLONE (MEDROL DOSEPACK) 4 MG TABLET    use as directed   Current Medications    CLONAZEPAM (KLONOPIN) 1 MG TABLET    TAKE 1 TABLET TWICE A DAY    L.ACIDOPHIL,PARAC-S.THERM-BIF. (RISAQUAD) CAP CAPSULE    Take 1 capsule by mouth once daily.    OXYCODONE-ACETAMINOPHEN (PERCOCET) 5-325 MG PER TABLET    Take 1 tablet by mouth every 6 (six) hours as needed for Pain.    SPIRONOLACTONE (ALDACTONE) 100 MG TABLET    1 po qday    VARENICLINE (CHANTIX STARTING MONTH BOX) 0.5 MG (11)- 1 MG (42) TABLET    Take one 0.5mg tab by mouth once daily X3 days,then increase to one 0.5mg tab twice daily X4 days,then increase to one 1mg tab twice daily    VARENICLINE (CHANTIX) 1 MG TAB    Take 1 tablet (1 mg total) by mouth 2 (two) times daily.   Discontinued Medications    CIPROFLOXACIN HCL (CIPRO) 500 MG TABLET    Take 1 tablet (500 mg total) by mouth every 12 (twelve) hours. for 7 days       Chief Complaint: Urinary Tract Infection  She was [facility] to [facility] on 2/13/2019 for pyelonephritis.  She has a history of a ureteral repositioning due to reflux causing recurrent UTI in 2013.     She was admitted for pyelonephritis and treated with IV x 2 days. She had a CT scan that showed bilateral urothelial thickening of the ureters with hyperenhancement compatible with urinary tract infection, no hydronephrosis, scarring consistent with old infections.  Her urine culture showed e coli and she was d/c on oral cipro x 10 days (she is on day 8/10).  Despite taking abx she continues with severe left lower back pain (this was her presenting symptoms in ER---no dysuria or urgency).  She says it is better then when she was seen in ER but unchanged from when she was d/c. No fevers but chills. No vomiting  "but nausea.  No odor to urine or dysuria. No frequency or urgency. No change in bowels.     Review of Systems   Constitutional: Negative for activity change, appetite change, chills, fatigue, fever and unexpected weight change.   HENT: Negative for congestion, ear discharge, ear pain, hearing loss, mouth sores, postnasal drip, rhinorrhea, sinus pressure, sore throat and trouble swallowing.    Eyes: Negative for pain, discharge, redness and visual disturbance.   Respiratory: Negative for cough, chest tightness, shortness of breath and wheezing.    Cardiovascular: Negative for chest pain and palpitations.   Gastrointestinal: Negative for abdominal pain, blood in stool, constipation, diarrhea, nausea and vomiting.   Endocrine: Positive for polyuria. Negative for polydipsia.   Genitourinary: Negative for difficulty urinating, dysuria, hematuria and menstrual problem.   Musculoskeletal: Positive for back pain. Negative for arthralgias, joint swelling, neck pain and neck stiffness.   Skin: Negative for rash.   Neurological: Positive for headaches. Negative for weakness.   Hematological: Negative for adenopathy.   Psychiatric/Behavioral: Negative for confusion and dysphoric mood.       Objective:      Vitals:    02/20/19 0807   BP: 92/68   Pulse: 98   Temp: 98 °F (36.7 °C)   TempSrc: Oral   SpO2: 99%   Weight: 61 kg (134 lb 6.4 oz)   Height: 5' 7" (1.702 m)     Body mass index is 21.05 kg/m².  Physical Exam    General appearance: alert, no acute distress  Mouth: no sores or lesion, moist mucous membranes  Neck: supple, FROM, no masses, no tenderness  Lymph: no posterior or cervical adenopathy  Lungs: no distress, no retractions, clear to ascultation bilaterally, diffuse wheezing, no rales, no rhonchi  Heart:: Regular rate and rhythm, no murmur  Abdomen: soft, mild suprapubic tenderness, no guarding, no rebound, no peritoneal signs, bowel sounds normal, no hepatosplenomegaly, no masses, positive CVAT on left flank  Skin: no " rashes or lesion  Perfusion: good capillary refill, normal pulses      Assessment:       1. Pyelonephritis    2. Wheezing        Plan:       Pyelonephritis  U/A still with leukocytes and blood despite day 8/10 of abx.  Will send for culture. Will check renal u/s. Will give her ceftriaxone in office and change abx to cefdinir.  If culture comes back negative then can stop abx after total of 10 days of abx.   -     POCT urine dipstick without microscope  -     cefTRIAXone injection 1 g  -     cefdinir (OMNICEF) 300 MG capsule; Take 1 capsule (300 mg total) by mouth 2 (two) times daily. for 10 days  Dispense: 20 capsule; Refill: 0  -     US Retroperitoneal Complete; Future; Expected date: 02/20/2019  -     Urine culture  -     C. trachomatis/N. gonorrhoeae by AMP DNA [facility]; Urine    Wheezing  Due to restarting smoking. Will give her a course of oral steroids and advised to use albuterol tid for a couple of days.  Stop smoking.   -     methylPREDNISolone (MEDROL DOSEPACK) 4 mg tablet; use as directed  Dispense: 1 Package; Refill: 0    Follow-up for will call with results.

## 2019-02-21 ENCOUNTER — PATIENT MESSAGE (OUTPATIENT)
Dept: FAMILY MEDICINE | Facility: CLINIC | Age: 29
End: 2019-02-21

## 2019-02-21 LAB
BACTERIA UR CULT: NORMAL
C TRACH DNA SPEC QL NAA+PROBE: NOT DETECTED
N GONORRHOEA DNA SPEC QL NAA+PROBE: NOT DETECTED

## 2019-02-21 RX ORDER — IBUPROFEN 200 MG
1 TABLET ORAL DAILY
Qty: 30 PATCH | Refills: 2 | Status: SHIPPED | OUTPATIENT
Start: 2019-02-21 | End: 2019-04-10

## 2019-02-22 ENCOUNTER — PATIENT MESSAGE (OUTPATIENT)
Dept: FAMILY MEDICINE | Facility: CLINIC | Age: 29
End: 2019-02-22

## 2019-03-12 ENCOUNTER — PATIENT MESSAGE (OUTPATIENT)
Dept: DERMATOLOGY | Facility: CLINIC | Age: 29
End: 2019-03-12

## 2019-03-22 ENCOUNTER — PATIENT MESSAGE (OUTPATIENT)
Dept: BARIATRICS | Facility: CLINIC | Age: 29
End: 2019-03-22

## 2019-04-10 ENCOUNTER — PATIENT MESSAGE (OUTPATIENT)
Dept: FAMILY MEDICINE | Facility: CLINIC | Age: 29
End: 2019-04-10

## 2019-04-10 RX ORDER — IBUPROFEN 200 MG
1 TABLET ORAL DAILY
Qty: 30 PATCH | Refills: 3 | Status: SHIPPED | OUTPATIENT
Start: 2019-04-10 | End: 2019-08-29

## 2019-04-26 ENCOUNTER — PATIENT MESSAGE (OUTPATIENT)
Dept: FAMILY MEDICINE | Facility: CLINIC | Age: 29
End: 2019-04-26

## 2019-04-29 RX ORDER — NORETHINDRONE ACETATE AND ETHINYL ESTRADIOL .02; 1 MG/1; MG/1
1 TABLET ORAL DAILY
Qty: 21 TABLET | Refills: 1 | Status: SHIPPED | OUTPATIENT
Start: 2019-04-29 | End: 2019-06-20 | Stop reason: SDUPTHER

## 2019-05-06 ENCOUNTER — OFFICE VISIT (OUTPATIENT)
Dept: FAMILY MEDICINE | Facility: CLINIC | Age: 29
End: 2019-05-06
Payer: COMMERCIAL

## 2019-05-06 VITALS
WEIGHT: 132.5 LBS | HEART RATE: 74 BPM | DIASTOLIC BLOOD PRESSURE: 60 MMHG | HEIGHT: 67 IN | SYSTOLIC BLOOD PRESSURE: 108 MMHG | BODY MASS INDEX: 20.8 KG/M2 | OXYGEN SATURATION: 98 %

## 2019-05-06 DIAGNOSIS — F33.1 MAJOR DEPRESSIVE DISORDER, RECURRENT, MODERATE: ICD-10-CM

## 2019-05-06 DIAGNOSIS — G47.30 SLEEP APNEA, UNSPECIFIED TYPE: ICD-10-CM

## 2019-05-06 DIAGNOSIS — K58.0 IRRITABLE BOWEL SYNDROME WITH DIARRHEA: ICD-10-CM

## 2019-05-06 DIAGNOSIS — F51.01 PRIMARY INSOMNIA: Primary | ICD-10-CM

## 2019-05-06 DIAGNOSIS — R06.2 WHEEZING: ICD-10-CM

## 2019-05-06 DIAGNOSIS — F41.1 GENERALIZED ANXIETY DISORDER: ICD-10-CM

## 2019-05-06 PROBLEM — R11.2 NAUSEA & VOMITING: Status: RESOLVED | Noted: 2017-03-09 | Resolved: 2019-05-06

## 2019-05-06 PROBLEM — R11.2 NON-INTRACTABLE VOMITING WITH NAUSEA: Status: RESOLVED | Noted: 2017-03-01 | Resolved: 2019-05-06

## 2019-05-06 PROBLEM — N12 PYELONEPHRITIS: Status: RESOLVED | Noted: 2019-02-14 | Resolved: 2019-05-06

## 2019-05-06 PROBLEM — R10.12 LEFT UPPER QUADRANT ABDOMINAL PAIN OF UNKNOWN ETIOLOGY: Status: RESOLVED | Noted: 2017-03-10 | Resolved: 2019-05-06

## 2019-05-06 PROBLEM — N10 ACUTE PYELONEPHRITIS: Status: RESOLVED | Noted: 2019-02-13 | Resolved: 2019-05-06

## 2019-05-06 PROCEDURE — 99214 OFFICE O/P EST MOD 30 MIN: CPT | Mod: S$GLB,,, | Performed by: FAMILY MEDICINE

## 2019-05-06 PROCEDURE — 99214 PR OFFICE/OUTPT VISIT, EST, LEVL IV, 30-39 MIN: ICD-10-PCS | Mod: S$GLB,,, | Performed by: FAMILY MEDICINE

## 2019-05-06 PROCEDURE — 99999 PR PBB SHADOW E&M-EST. PATIENT-LVL IV: ICD-10-PCS | Mod: PBBFAC,,, | Performed by: FAMILY MEDICINE

## 2019-05-06 PROCEDURE — 99999 PR PBB SHADOW E&M-EST. PATIENT-LVL IV: CPT | Mod: PBBFAC,,, | Performed by: FAMILY MEDICINE

## 2019-05-06 RX ORDER — CLONAZEPAM 1 MG/1
1 TABLET ORAL NIGHTLY PRN
Qty: 30 TABLET | Refills: 0 | Status: SHIPPED | OUTPATIENT
Start: 2019-05-06 | End: 2019-05-30 | Stop reason: SDUPTHER

## 2019-05-06 RX ORDER — CLONAZEPAM 1 MG/1
1 TABLET ORAL NIGHTLY PRN
Qty: 30 TABLET | Refills: 0 | Status: SHIPPED | OUTPATIENT
Start: 2019-05-06 | End: 2019-05-06 | Stop reason: SDUPTHER

## 2019-05-06 NOTE — PROGRESS NOTES
Subjective:       Patient ID: Stephanie Harding is a 29 y.o. female.    Chief Complaint: Medication Refill (Klonopin)    28 yo F pt, new to me (regularly followed by Dr. Wang), with PMH with significant MDD, MEENU, IBS-D, sleep apnea, and acne.  She presents with c/o insomnia x several years. She has no problem falling asleep, however, reports difficulty staying sleep. Symptoms have been worse since being in a stable relationship with her boyfriend sleeping next to her--states that she wakes easily. + h/o sleep apnea. She lost 40 lbs over the past 2 years--states that she has been told that she no longer snores and no longer stops breathing while she sleeps.  Denies excessive daytime sleepiness/drowsiness.  She is not using any maintenance medications for MEENU/MDD.  She reports weaning herself off of previously prescribed antidepressants and anxiolytics as these medications made her numb. Reports her mood has been stable.  Denies feeling down, depressed, or anxious. She tried hydroxyzine 1 year ago per another physician's suggestion,  however this caused her to become over-sedated, sleeping 12-16 hours, and excessive grogginess/drowsiness during the day.  She reports over-the-counter sleep aids such as melatonin are not helpful.  She is not really interested in trying any other non habit-forming medications for sleep due to fear of unwanted side effects.      Review of Systems   Constitutional: Negative for activity change and unexpected weight change.   HENT: Negative for hearing loss, rhinorrhea and trouble swallowing.    Eyes: Negative for discharge and visual disturbance.   Respiratory: Negative for chest tightness and wheezing.    Cardiovascular: Negative for chest pain and palpitations.   Gastrointestinal: Negative for blood in stool, constipation, diarrhea and vomiting.   Endocrine: Negative for polydipsia and polyuria.   Genitourinary: Negative for difficulty urinating, dysuria, hematuria and menstrual problem.  "  Musculoskeletal: Negative for arthralgias, joint swelling and neck pain.   Neurological: Negative for weakness and headaches.   Psychiatric/Behavioral: Negative for confusion and dysphoric mood.         Past Medical History:   Diagnosis Date    Acute pyelonephritis 2/13/2019    Anxiety     Fatigue 3/4/2016    depite 10h sleep; TSH Feb 2016 NML; possibly depression related     HSV-2 (herpes simplex virus 2) infection 2014    Irritable bowel syndrome     Left upper quadrant abdominal pain of unknown etiology 3/10/2017    Nausea & vomiting 3/9/2017    Smoker        Patient Active Problem List   Diagnosis    Acne vulgaris    Generalized anxiety disorder    Bile salt-induced diarrhea    Irritable bowel syndrome with diarrhea    Sleep apnea    Major depressive disorder, recurrent, moderate    Primary insomnia    Chronic diarrhea    Smoker       Past Surgical History:   Procedure Laterality Date    CHOLECYSTECTOMY  2012    COLONOSCOPY N/A 5/2/2017    [facility] Jr[facility]    ESOPHAGOGASTRODUODENOSCOPY (EGD) N/A 3/9/2017    [facility]    UPPER GASTROINTESTINAL ENDOSCOPY  03/09/2017    Dr. Nam    URETERAL REIMPLANTION         Family History   Problem Relation Age of Onset    Pancreatic cancer Paternal Grandfather     Irritable bowel syndrome Mother     Bipolar disorder Father     Lupus Maternal Grandmother     Colon cancer Neg Hx     Colon polyps Neg Hx     Crohn's disease Neg Hx     Ulcerative colitis Neg Hx        Social History     Tobacco Use   Smoking Status Current Every Day Smoker    Packs/day: 0.50    Years: 11.00    Pack years: 5.50    Types: Cigarettes    Start date: 1/1/2007   Smokeless Tobacco Never Used           Objective:        Vitals:    05/06/19 1515   BP: 108/60   Pulse: 74   SpO2: 98%   Weight: 60.1 kg (132 lb 7.9 oz)   Height: 5' 7" (1.702 m)       Physical Exam   Constitutional: She is " oriented to person, place, and time. She appears well-developed and well-nourished. No distress.   HENT:   Head: Normocephalic and atraumatic.   Right Ear: External ear normal.   Left Ear: External ear normal.   Nose: Nose normal.   Mouth/Throat: Mucous membranes are normal.   Eyes: Conjunctivae and EOM are normal. No scleral icterus.   Neck: Normal range of motion. Neck supple.   Cardiovascular: Normal rate, regular rhythm and normal heart sounds. Exam reveals no gallop and no friction rub.   No murmur heard.  Pulmonary/Chest: Effort normal. She has wheezes (+ mild end-inspiratory wheezing throughout). She has no rales.   Musculoskeletal: Normal range of motion. She exhibits no edema.   Lymphadenopathy:     She has no cervical adenopathy.   Neurological: She is alert and oriented to person, place, and time. No cranial nerve deficit.   Skin: Skin is warm and dry. No rash noted. No erythema.   Psychiatric: She has a normal mood and affect. Her behavior is normal.       Assessment:       1. Primary insomnia    2. Generalized anxiety disorder    3. Major depressive disorder, recurrent, moderate    4. Sleep apnea, unspecified type    5. Irritable bowel syndrome with diarrhea        Plan:       Stephanie was seen today for medication refill.    Diagnoses and all orders for this visit:    Primary insomnia  -     Discontinue: clonazePAM (KLONOPIN) 1 MG tablet; Take 1 tablet (1 mg total) by mouth nightly as needed for Anxiety.  -     clonazePAM (KLONOPIN) 1 MG tablet; Take 1 tablet (1 mg total) by mouth nightly as needed for Anxiety.    Generalized anxiety disorder  -     Discontinue: clonazePAM (KLONOPIN) 1 MG tablet; Take 1 tablet (1 mg total) by mouth nightly as needed for Anxiety.    Major depressive disorder, recurrent, moderate    Sleep apnea, unspecified type    Irritable bowel syndrome with diarrhea    Insomnia   --Discussed with patient that I do not manage insomnia with long-term benzodiazepine use.  Given that she  is not interested in trying any other medications, will give a 1 month supply clonazepam 1 mg hs.  Instructed to follow up with her PCP for any additional refills.     Anxiety/MDD   --Currently non-adherent with maintenance therapy such as SSRIs/SNRIs  --Overall feels that mood is stable  --Will continue to monitor    BIBI  Patient has intentionally lost 40 lbs over the past 2 years.  Reports she is no longer snoring.  Boyfriend no longer witnesses apneic episodes.  Denies daytime fatigue.    IBS-D   Chronic. Stable.  No acute issues    Wheezing  Incidentally noted on exam.  Upon further questioning, patient reported that she was recently getting over upper respiratory infection symptoms.  No associated chest tightness or shortness of breath.  Discussed importance of tobacco cessation.  Advised to schedule follow-up visit if cough or wheezing worsening.     F/U with PCP scheduled for 5/30/19.

## 2019-05-30 ENCOUNTER — OFFICE VISIT (OUTPATIENT)
Dept: FAMILY MEDICINE | Facility: CLINIC | Age: 29
End: 2019-05-30
Attending: FAMILY MEDICINE
Payer: COMMERCIAL

## 2019-05-30 VITALS
HEART RATE: 86 BPM | SYSTOLIC BLOOD PRESSURE: 114 MMHG | BODY MASS INDEX: 19.96 KG/M2 | WEIGHT: 127.19 LBS | DIASTOLIC BLOOD PRESSURE: 62 MMHG | HEIGHT: 67 IN | OXYGEN SATURATION: 98 %

## 2019-05-30 DIAGNOSIS — F51.01 PRIMARY INSOMNIA: Primary | ICD-10-CM

## 2019-05-30 DIAGNOSIS — F41.1 GENERALIZED ANXIETY DISORDER: ICD-10-CM

## 2019-05-30 DIAGNOSIS — K58.0 IRRITABLE BOWEL SYNDROME WITH DIARRHEA: ICD-10-CM

## 2019-05-30 DIAGNOSIS — Z23 IMMUNIZATION DUE: ICD-10-CM

## 2019-05-30 DIAGNOSIS — L70.0 ACNE VULGARIS: ICD-10-CM

## 2019-05-30 PROCEDURE — 90471 IMMUNIZATION ADMIN: CPT | Mod: S$GLB,,, | Performed by: FAMILY MEDICINE

## 2019-05-30 PROCEDURE — 99214 PR OFFICE/OUTPT VISIT, EST, LEVL IV, 30-39 MIN: ICD-10-PCS | Mod: 25,S$GLB,, | Performed by: FAMILY MEDICINE

## 2019-05-30 PROCEDURE — 99999 PR PBB SHADOW E&M-EST. PATIENT-LVL III: CPT | Mod: PBBFAC,,, | Performed by: FAMILY MEDICINE

## 2019-05-30 PROCEDURE — 99999 PR PBB SHADOW E&M-EST. PATIENT-LVL III: ICD-10-PCS | Mod: PBBFAC,,, | Performed by: FAMILY MEDICINE

## 2019-05-30 PROCEDURE — 90471 TDAP VACCINE GREATER THAN OR EQUAL TO 7YO IM: ICD-10-PCS | Mod: S$GLB,,, | Performed by: FAMILY MEDICINE

## 2019-05-30 PROCEDURE — 90715 TDAP VACCINE 7 YRS/> IM: CPT | Mod: S$GLB,,, | Performed by: FAMILY MEDICINE

## 2019-05-30 PROCEDURE — 90715 TDAP VACCINE GREATER THAN OR EQUAL TO 7YO IM: ICD-10-PCS | Mod: S$GLB,,, | Performed by: FAMILY MEDICINE

## 2019-05-30 PROCEDURE — 99214 OFFICE O/P EST MOD 30 MIN: CPT | Mod: 25,S$GLB,, | Performed by: FAMILY MEDICINE

## 2019-05-30 RX ORDER — CLONAZEPAM 1 MG/1
1 TABLET ORAL NIGHTLY PRN
Qty: 30 TABLET | Refills: 2 | Status: SHIPPED | OUTPATIENT
Start: 2019-05-30 | End: 2019-08-29 | Stop reason: SDUPTHER

## 2019-05-30 NOTE — PROGRESS NOTES
Subjective:       Patient ID: Stephanie Harding is a 29 y.o. female.    Chief Complaint: Follow-up; Medication Refill; and Immunizations (Tetanus)    29 yr old pleasant white female with acne vulgaris, generalized anxiety disorder, presents today for evaluation of anxiety and depression.    Acne vulgaris - chronic - currently stable - she is on clindamycin gel - she also takes spironolactone daily      Anxiety disorder/depression - chronic - and currently uncontrolled - on wellbutrin daily and also celexa - no SI/HI/hallucinations - reports have issues with her family life and small things making her more anxious and depressed mood - no motivation or energy and sleep issues - she is on meds x 7 years and they seems to have no affect during her emotional moments - she is on klonopin as needed      History as below - reviewed      Health maintenance  -labs she reports UTD  -pap UTD  -vaccines declines    Medication Refill   This is a chronic problem. The current episode started more than 1 year ago. The problem occurs constantly. The problem has been gradually improving. Associated symptoms include a rash. Pertinent negatives include no abdominal pain, anorexia, arthralgias, chest pain, chills, congestion, coughing, diaphoresis, fever, headaches, joint swelling, myalgias, nausea, neck pain, numbness, sore throat, vertigo, vomiting or weakness. Nothing aggravates the symptoms. Treatments tried: as below. The treatment provided significant relief.   Rash   This is a chronic problem. The current episode started more than 1 year ago. The problem has been gradually improving since onset. The rash is characterized by swelling and draining. She was exposed to nothing. Pertinent negatives include no anorexia, congestion, cough, diarrhea, eye pain, facial edema, fever, nail changes, rhinorrhea, shortness of breath, sore throat or vomiting. Past treatments include antibiotic cream. The treatment provided no relief. There is no  history of allergies, asthma, eczema or varicella. (Acne)   Anxiety   Presents for follow-up visit. Onset was 1 to 5 years ago. The problem has been rapidly improving. Symptoms include decreased concentration, excessive worry and nervous/anxious behavior. Patient reports no chest pain, confusion, depressed mood, dizziness, dry mouth, feeling of choking, impotence, irritability, nausea, palpitations, restlessness, shortness of breath or suicidal ideas. Symptoms occur rarely. The severity of symptoms is moderate. Nothing aggravates the symptoms. The quality of sleep is good. Nighttime awakenings: none.     There are no known risk factors. Her past medical history is significant for anxiety/panic attacks. There is no history of asthma, bipolar disorder, CAD, chronic lung disease, depression, fibromyalgia or suicide attempts. (Acne) Past treatments include non-benzodiazephine anxiolytics. The treatment provided significant relief. Compliance with prior treatments has been good.     Review of Systems   Constitutional: Negative.  Negative for activity change, chills, diaphoresis, fever, irritability and unexpected weight change.   HENT: Negative.  Negative for congestion, ear discharge, hearing loss, rhinorrhea, sore throat, trouble swallowing and voice change.    Eyes: Negative.  Negative for pain, discharge and visual disturbance.   Respiratory: Negative.  Negative for cough, chest tightness, shortness of breath and wheezing.    Cardiovascular: Negative.  Negative for chest pain and palpitations.   Gastrointestinal: Negative.  Negative for abdominal distention, abdominal pain, anal bleeding, anorexia, blood in stool, constipation, diarrhea, nausea and vomiting.   Endocrine: Negative.  Negative for cold intolerance, polydipsia and polyuria.   Genitourinary: Negative.  Negative for decreased urine volume, difficulty urinating, dysuria, frequency, hematuria, impotence, menstrual problem and vaginal pain.   Musculoskeletal:  Negative.  Negative for arthralgias, gait problem, joint swelling, myalgias and neck pain.   Skin: Positive for rash. Negative for color change, nail changes, pallor and wound.   Allergic/Immunologic: Negative.  Negative for environmental allergies and immunocompromised state.   Neurological: Negative.  Negative for dizziness, vertigo, tremors, seizures, speech difficulty, weakness, numbness and headaches.   Hematological: Negative.  Negative for adenopathy. Does not bruise/bleed easily.   Psychiatric/Behavioral: Positive for decreased concentration. Negative for agitation, confusion, dysphoric mood, hallucinations, self-injury and suicidal ideas. The patient is nervous/anxious.        PMH/PSH/FH/SH/MED/ALLERGY reviewed    Objective:       Vitals:    05/30/19 1355   BP: 114/62   Pulse: 86       Physical Exam   Constitutional: She is oriented to person, place, and time. She appears well-developed and well-nourished. No distress.   HENT:   Head: Normocephalic and atraumatic.   Right Ear: External ear normal.   Left Ear: External ear normal.   Nose: Nose normal.   Mouth/Throat: Oropharynx is clear and moist. No oropharyngeal exudate.   Mild to moderate nodular acne on face   Eyes: Pupils are equal, round, and reactive to light. Conjunctivae and EOM are normal. Right eye exhibits no discharge. Left eye exhibits no discharge. No scleral icterus.   Neck: Normal range of motion. Neck supple. No JVD present. No tracheal deviation present. No thyromegaly present.   Cardiovascular: Normal rate, regular rhythm, normal heart sounds and intact distal pulses. Exam reveals no gallop and no friction rub.   No murmur heard.  Pulmonary/Chest: Effort normal and breath sounds normal. No respiratory distress. She has no wheezes. She has no rales. She exhibits no tenderness.   Abdominal: Soft. Bowel sounds are normal. She exhibits no distension and no mass. There is no tenderness. There is no rebound and no guarding. No hernia.    Musculoskeletal: Normal range of motion. She exhibits no edema or tenderness.   Lymphadenopathy:     She has no cervical adenopathy.   Neurological: She is alert and oriented to person, place, and time. She has normal reflexes. She displays normal reflexes. No cranial nerve deficit. She exhibits normal muscle tone. Coordination normal.   Skin: Skin is warm and dry. Rash noted. She is not diaphoretic. There is erythema.   Psychiatric: Her behavior is normal. Judgment and thought content normal.   Anxious and depressed mood       Assessment:       1. Primary insomnia    2. Generalized anxiety disorder    3. Acne vulgaris    4. Irritable bowel syndrome with diarrhea    5. Immunization due        Plan:           Stephanie was seen today for follow-up, medication refill and immunizations.    Diagnoses and all orders for this visit:    Primary insomnia  -     clonazePAM (KLONOPIN) 1 MG tablet; Take 1 tablet (1 mg total) by mouth nightly as needed for Anxiety.  -     CBC auto differential; Future  -     Comprehensive metabolic panel; Future  -     Lipid panel; Future    Generalized anxiety disorder  -     clonazePAM (KLONOPIN) 1 MG tablet; Take 1 tablet (1 mg total) by mouth nightly as needed for Anxiety.  -     TSH; Future  -     Vitamin D; Future    Acne vulgaris    Irritable bowel syndrome with diarrhea  -     CBC auto differential; Future  -     Comprehensive metabolic panel; Future  -     Lipid panel; Future    Immunization due  -     (In Office Administered) Tdap Vaccine       MEENU/depression/insomnia  -klonopin as needed once or twice/day  -recommend relaxation techniques as well  -SI/ER precautions given and also we discussed need for Psychiatry referral if meds not working in future    Acne -controlled    IBS - no new issues - baseline    Spent adequate time in obtaining history and explaining differentials    40 minutes spent during this visit of which greater than 50% devoted to face-face counseling and  coordination of care regarding diagnosis and management plan    Follow up in about 3 months (around 8/30/2019), or if symptoms worsen or fail to improve.

## 2019-05-30 NOTE — LETTER
May 30, 2019    Stephanie Harding  [address] [facility] [place] [address]         [address]  [place] [address]  Phone: [phone]  Fax: [phone] May 30, 2019     Patient: Stephanie Harding   YOB: 1990   Date of Visit: 5/30/2019       To Whom It May Concern:    Ms. Stephanie Harding is a patient under my care for depression and generalized anxiety disorder. Her  canine serves as an emotional support animal and mitigates the effects of her emotional disorder. Her dog meets the definition of an emotional support animal under the Fair Housing Act. It is my medical opinion that her conditions meet the definition of a mental impairment under the Americans with Disabilities Act.      If you have any questions or concerns, please don't hesitate to call.    Sincerely,        Aydin Wang MD

## 2019-06-20 RX ORDER — NORETHINDRONE ACETATE AND ETHINYL ESTRADIOL .02; 1 MG/1; MG/1
1 TABLET ORAL DAILY
Qty: 21 TABLET | Refills: 1 | Status: SHIPPED | OUTPATIENT
Start: 2019-06-20 | End: 2019-08-19 | Stop reason: SDUPTHER

## 2019-08-19 ENCOUNTER — PATIENT MESSAGE (OUTPATIENT)
Dept: OBSTETRICS AND GYNECOLOGY | Facility: CLINIC | Age: 29
End: 2019-08-19

## 2019-08-19 ENCOUNTER — PATIENT MESSAGE (OUTPATIENT)
Dept: FAMILY MEDICINE | Facility: CLINIC | Age: 29
End: 2019-08-19

## 2019-08-19 DIAGNOSIS — Z72.0 TOBACCO ABUSE: ICD-10-CM

## 2019-08-19 RX ORDER — NORETHINDRONE ACETATE AND ETHINYL ESTRADIOL .02; 1 MG/1; MG/1
1 TABLET ORAL DAILY
Qty: 21 TABLET | Refills: 0 | Status: SHIPPED | OUTPATIENT
Start: 2019-08-19 | End: 2019-12-20

## 2019-08-19 RX ORDER — VARENICLINE TARTRATE 0.5 (11)-1
KIT ORAL
Qty: 1 PACKAGE | Refills: 0 | OUTPATIENT
Start: 2019-08-19 | End: 2019-08-29 | Stop reason: SDUPTHER

## 2019-08-19 RX ORDER — VARENICLINE TARTRATE 1 MG/1
1 TABLET, FILM COATED ORAL 2 TIMES DAILY
Qty: 60 TABLET | Refills: 0 | OUTPATIENT
Start: 2019-08-19 | End: 2019-08-29 | Stop reason: SDUPTHER

## 2019-08-19 NOTE — TELEPHONE ENCOUNTER
----- Message from Bertha Kennedy sent at 8/19/2019  2:04 PM CDT -----  Contact: Self [phone]  Patient Returning Your Phone Call

## 2019-08-19 NOTE — TELEPHONE ENCOUNTER
She is 4 months overdue for an annual. I sent a refill of her birth control but she needs to schedule an appointment to get any more after this. Can you call her and see if she would to schedule an annual?

## 2019-08-19 NOTE — TELEPHONE ENCOUNTER
Spoke to pt and informed her that Ramírez from GYN, Dr. Brar's office was trying to get in touch with her.

## 2019-08-23 ENCOUNTER — PATIENT MESSAGE (OUTPATIENT)
Dept: FAMILY MEDICINE | Facility: CLINIC | Age: 29
End: 2019-08-23

## 2019-08-29 ENCOUNTER — OFFICE VISIT (OUTPATIENT)
Dept: FAMILY MEDICINE | Facility: CLINIC | Age: 29
End: 2019-08-29
Attending: FAMILY MEDICINE
Payer: COMMERCIAL

## 2019-08-29 VITALS
HEIGHT: 67 IN | OXYGEN SATURATION: 95 % | BODY MASS INDEX: 19.24 KG/M2 | SYSTOLIC BLOOD PRESSURE: 104 MMHG | DIASTOLIC BLOOD PRESSURE: 62 MMHG | HEART RATE: 94 BPM | WEIGHT: 122.56 LBS

## 2019-08-29 DIAGNOSIS — F51.01 PRIMARY INSOMNIA: ICD-10-CM

## 2019-08-29 DIAGNOSIS — F41.1 GENERALIZED ANXIETY DISORDER: Primary | ICD-10-CM

## 2019-08-29 DIAGNOSIS — K58.0 IRRITABLE BOWEL SYNDROME WITH DIARRHEA: ICD-10-CM

## 2019-08-29 DIAGNOSIS — N39.0 RECURRENT UTI: ICD-10-CM

## 2019-08-29 DIAGNOSIS — Z72.0 TOBACCO ABUSE: ICD-10-CM

## 2019-08-29 PROCEDURE — 99999 PR PBB SHADOW E&M-EST. PATIENT-LVL IV: ICD-10-PCS | Mod: PBBFAC,,, | Performed by: FAMILY MEDICINE

## 2019-08-29 PROCEDURE — 99214 OFFICE O/P EST MOD 30 MIN: CPT | Mod: S$GLB,,, | Performed by: FAMILY MEDICINE

## 2019-08-29 PROCEDURE — 99214 PR OFFICE/OUTPT VISIT, EST, LEVL IV, 30-39 MIN: ICD-10-PCS | Mod: S$GLB,,, | Performed by: FAMILY MEDICINE

## 2019-08-29 PROCEDURE — 99999 PR PBB SHADOW E&M-EST. PATIENT-LVL IV: CPT | Mod: PBBFAC,,, | Performed by: FAMILY MEDICINE

## 2019-08-29 RX ORDER — CLONAZEPAM 1 MG/1
1 TABLET ORAL NIGHTLY PRN
Qty: 30 TABLET | Refills: 2 | Status: SHIPPED | OUTPATIENT
Start: 2019-08-29 | End: 2019-11-12 | Stop reason: SDUPTHER

## 2019-08-29 RX ORDER — VARENICLINE TARTRATE 1 MG/1
1 TABLET, FILM COATED ORAL 2 TIMES DAILY
Qty: 60 TABLET | Refills: 0 | Status: SHIPPED | OUTPATIENT
Start: 2019-08-29 | End: 2019-12-20

## 2019-08-29 RX ORDER — VARENICLINE TARTRATE 0.5 (11)-1
KIT ORAL
Qty: 1 PACKAGE | Refills: 0 | Status: SHIPPED | OUTPATIENT
Start: 2019-08-29 | End: 2019-12-20

## 2019-08-29 NOTE — PROGRESS NOTES
Subjective:       Patient ID: Stephanie Harding is a 29 y.o. female.    Chief Complaint: Follow-up    29 yr old pleasant white female with acne vulgaris, generalized anxiety disorder, presents today for evaluation of anxiety and depression. SHE ALSO REPORTED RECURRENT UTI AND REFERRAL TO SEE UROLOGY.    Acne vulgaris - chronic - currently stable - she is on clindamycin gel - she also takes spironolactone daily      Anxiety disorder/depression - chronic - and currently uncontrolled - on wellbutrin daily and also celexa - no SI/HI/hallucinations - reports have issues with her family life and small things making her more anxious and depressed mood - no motivation or energy and sleep issues - she is on meds x 7 years and they seems to have no affect during her emotional moments - she is on klonopin as needed      History as below - reviewed      Health maintenance  -labs she reports UTD  -pap UTD  -vaccines declines    Medication Refill   This is a chronic problem. The current episode started more than 1 year ago. The problem occurs constantly. The problem has been gradually improving. Associated symptoms include a rash. Pertinent negatives include no abdominal pain, anorexia, arthralgias, chest pain, chills, congestion, coughing, diaphoresis, fever, headaches, joint swelling, myalgias, nausea, neck pain, numbness, sore throat, vertigo, vomiting or weakness. Nothing aggravates the symptoms. Treatments tried: as below. The treatment provided significant relief.   Rash   This is a chronic problem. The current episode started more than 1 year ago. The problem has been gradually improving since onset. The rash is characterized by swelling and draining. She was exposed to nothing. Pertinent negatives include no anorexia, congestion, cough, diarrhea, eye pain, facial edema, fever, nail changes, rhinorrhea, shortness of breath, sore throat or vomiting. Past treatments include antibiotic cream. The treatment provided no relief.  There is no history of allergies, asthma, eczema or varicella. (Acne)   Anxiety   Presents for follow-up visit. Onset was 1 to 5 years ago. The problem has been rapidly improving. Symptoms include decreased concentration, excessive worry and nervous/anxious behavior. Patient reports no chest pain, confusion, depressed mood, dizziness, dry mouth, feeling of choking, impotence, irritability, nausea, palpitations, restlessness, shortness of breath or suicidal ideas. Symptoms occur rarely. The severity of symptoms is moderate. Nothing aggravates the symptoms. The quality of sleep is good. Nighttime awakenings: none.     There are no known risk factors. Her past medical history is significant for anxiety/panic attacks. There is no history of asthma, bipolar disorder, CAD, chronic lung disease, depression, fibromyalgia or suicide attempts. (Acne) Past treatments include non-benzodiazephine anxiolytics. The treatment provided significant relief. Compliance with prior treatments has been good.     Review of Systems   Constitutional: Negative.  Negative for activity change, chills, diaphoresis, fever, irritability and unexpected weight change.   HENT: Negative.  Negative for congestion, ear discharge, hearing loss, rhinorrhea, sore throat, trouble swallowing and voice change.    Eyes: Negative.  Negative for pain, discharge and visual disturbance.   Respiratory: Negative.  Negative for cough, chest tightness, shortness of breath and wheezing.    Cardiovascular: Negative.  Negative for chest pain and palpitations.   Gastrointestinal: Negative.  Negative for abdominal distention, abdominal pain, anal bleeding, anorexia, blood in stool, constipation, diarrhea, nausea and vomiting.   Endocrine: Negative.  Negative for cold intolerance, polydipsia and polyuria.   Genitourinary: Negative.  Negative for decreased urine volume, difficulty urinating, dysuria, frequency, hematuria, impotence, menstrual problem and vaginal pain.    Musculoskeletal: Negative.  Negative for arthralgias, gait problem, joint swelling, myalgias and neck pain.   Skin: Positive for rash. Negative for color change, nail changes, pallor and wound.   Allergic/Immunologic: Negative.  Negative for environmental allergies and immunocompromised state.   Neurological: Negative.  Negative for dizziness, vertigo, tremors, seizures, speech difficulty, weakness, numbness and headaches.   Hematological: Negative.  Negative for adenopathy. Does not bruise/bleed easily.   Psychiatric/Behavioral: Positive for decreased concentration. Negative for agitation, confusion, dysphoric mood, hallucinations, self-injury and suicidal ideas. The patient is nervous/anxious.        PMH/PSH/FH/SH/MED/ALLERGY reviewed    Objective:       Vitals:    08/29/19 1614   BP: 104/62   Pulse: 94       Physical Exam   Constitutional: She is oriented to person, place, and time. She appears well-developed and well-nourished. No distress.   HENT:   Head: Normocephalic and atraumatic.   Right Ear: External ear normal.   Left Ear: External ear normal.   Nose: Nose normal.   Mouth/Throat: Oropharynx is clear and moist. No oropharyngeal exudate.   Mild to moderate nodular acne on face   Eyes: Pupils are equal, round, and reactive to light. Conjunctivae and EOM are normal. Right eye exhibits no discharge. Left eye exhibits no discharge. No scleral icterus.   Neck: Normal range of motion. Neck supple. No JVD present. No tracheal deviation present. No thyromegaly present.   Cardiovascular: Normal rate, regular rhythm, normal heart sounds and intact distal pulses. Exam reveals no gallop and no friction rub.   No murmur heard.  Pulmonary/Chest: Effort normal and breath sounds normal. No respiratory distress. She has no wheezes. She has no rales. She exhibits no tenderness.   Abdominal: Soft. Bowel sounds are normal. She exhibits no distension and no mass. There is no tenderness. There is no rebound and no guarding.  No hernia.   Musculoskeletal: Normal range of motion. She exhibits no edema or tenderness.   Lymphadenopathy:     She has no cervical adenopathy.   Neurological: She is alert and oriented to person, place, and time. She has normal reflexes. She displays normal reflexes. No cranial nerve deficit. She exhibits normal muscle tone. Coordination normal.   Skin: Skin is warm and dry. Rash noted. She is not diaphoretic. There is erythema.   Psychiatric: Her behavior is normal. Judgment and thought content normal.   Anxious and depressed mood       Assessment:       1. Generalized anxiety disorder    2. Primary insomnia    3. Tobacco abuse    4. Irritable bowel syndrome with diarrhea    5. Recurrent UTI        Plan:           Stephanie was seen today for follow-up.    Diagnoses and all orders for this visit:    Generalized anxiety disorder  -     clonazePAM (KLONOPIN) 1 MG tablet; Take 1 tablet (1 mg total) by mouth nightly as needed for Anxiety.    Primary insomnia  -     clonazePAM (KLONOPIN) 1 MG tablet; Take 1 tablet (1 mg total) by mouth nightly as needed for Anxiety.    Tobacco abuse  -     varenicline (CHANTIX) 1 mg Tab; Take 1 tablet (1 mg total) by mouth 2 (two) times daily.  -     varenicline (CHANTIX STARTING MONTH BOX) 0.5 mg (11)- 1 mg (42) tablet; Take one 0.5mg tab by mouth once daily X3 days,then increase to one 0.5mg tab twice daily X4 days,then increase to one 1mg tab twice daily    Irritable bowel syndrome with diarrhea    Recurrent UTI  -     Ambulatory referral to Urology      MEENU/depression/insomnia  -klonopin as needed once or twice/day  -recommend relaxation techniques as well  -SI/ER precautions given and also we discussed need for Psychiatry referral if meds not working in future    Acne -controlled    IBS - no new issues - baseline    RECURRENT UT  -REFER UROLOGY    Spent adequate time in obtaining history and explaining differentials    40 minutes spent during this visit of which greater than 50%  devoted to face-face counseling and coordination of care regarding diagnosis and management plan    Follow up in about 3 months (around 11/29/2019), or if symptoms worsen or fail to improve.

## 2019-09-17 ENCOUNTER — PATIENT OUTREACH (OUTPATIENT)
Dept: ADMINISTRATIVE | Facility: OTHER | Age: 29
End: 2019-09-17

## 2019-09-19 ENCOUNTER — CLINICAL SUPPORT (OUTPATIENT)
Dept: OBSTETRICS AND GYNECOLOGY | Facility: CLINIC | Age: 29
End: 2019-09-19
Payer: COMMERCIAL

## 2019-09-19 ENCOUNTER — OFFICE VISIT (OUTPATIENT)
Dept: OBSTETRICS AND GYNECOLOGY | Facility: CLINIC | Age: 29
End: 2019-09-19
Payer: COMMERCIAL

## 2019-09-19 VITALS
SYSTOLIC BLOOD PRESSURE: 98 MMHG | BODY MASS INDEX: 19.62 KG/M2 | DIASTOLIC BLOOD PRESSURE: 70 MMHG | WEIGHT: 125 LBS | HEIGHT: 67 IN

## 2019-09-19 DIAGNOSIS — R31.21 ASYMPTOMATIC MICROSCOPIC HEMATURIA: ICD-10-CM

## 2019-09-19 DIAGNOSIS — Z12.4 PAP SMEAR FOR CERVICAL CANCER SCREENING: ICD-10-CM

## 2019-09-19 DIAGNOSIS — R31.9 HEMATURIA, UNSPECIFIED TYPE: ICD-10-CM

## 2019-09-19 DIAGNOSIS — Z01.419 ENCOUNTER FOR ANNUAL ROUTINE GYNECOLOGICAL EXAMINATION: Primary | ICD-10-CM

## 2019-09-19 LAB
B-HCG UR QL: NEGATIVE
CTP QC/QA: YES

## 2019-09-19 PROCEDURE — 81025 URINE PREGNANCY TEST: CPT | Mod: S$GLB,,, | Performed by: OBSTETRICS & GYNECOLOGY

## 2019-09-19 PROCEDURE — 88175 CYTOPATH C/V AUTO FLUID REDO: CPT

## 2019-09-19 PROCEDURE — 99999 PR PBB SHADOW E&M-EST. PATIENT-LVL III: ICD-10-PCS | Mod: PBBFAC,,, | Performed by: OBSTETRICS & GYNECOLOGY

## 2019-09-19 PROCEDURE — 99395 PREV VISIT EST AGE 18-39: CPT | Mod: S$GLB,,, | Performed by: OBSTETRICS & GYNECOLOGY

## 2019-09-19 PROCEDURE — 81025 POCT URINE PREGNANCY: ICD-10-PCS | Mod: S$GLB,,, | Performed by: OBSTETRICS & GYNECOLOGY

## 2019-09-19 PROCEDURE — 87086 URINE CULTURE/COLONY COUNT: CPT

## 2019-09-19 PROCEDURE — 99395 PR PREVENTIVE VISIT,EST,18-39: ICD-10-PCS | Mod: S$GLB,,, | Performed by: OBSTETRICS & GYNECOLOGY

## 2019-09-19 PROCEDURE — 87624 HPV HI-RISK TYP POOLED RSLT: CPT

## 2019-09-19 PROCEDURE — 99999 PR PBB SHADOW E&M-EST. PATIENT-LVL III: CPT | Mod: PBBFAC,,, | Performed by: OBSTETRICS & GYNECOLOGY

## 2019-09-19 RX ORDER — MEDROXYPROGESTERONE ACETATE 150 MG/ML
150 INJECTION, SUSPENSION INTRAMUSCULAR
Status: SHIPPED | OUTPATIENT
Start: 2019-09-19

## 2019-09-19 NOTE — PROGRESS NOTES
Chief Complaint   Patient presents with    Well Woman       HISTORY OF PRESENT ILLNESS:   Stephanie Harding is a 29 y.o. female  who presents for well woman exam.  Patient's last menstrual period was 08/23/2019 (approximate)..  She would like to go back to the depo shot. She reports that 2 weeks ago she started having severe right flank pain due to her h/o or uretral reflux she was concerned so went to see urology and was scheduled for US. She has been taking Azo cranberry and feel like it has improved. Declines STD testing      Past Medical History:   Diagnosis Date    Acute pyelonephritis 2/13/2019    Anxiety     Fatigue 3/4/2016    depite 10h sleep; TSH Feb 2016 NML; possibly depression related     HSV-2 (herpes simplex virus 2) infection 2014    Irritable bowel syndrome     Left upper quadrant abdominal pain of unknown etiology 3/10/2017    Nausea & vomiting 3/9/2017    Smoker           Past Surgical History:   Procedure Laterality Date    CHOLECYSTECTOMY  2012    COLONOSCOPY N/A 5/2/2017    Performed by Bib Haq Jr., [facility] at [facility]    ESOPHAGOGASTRODUODENOSCOPY (EGD) N/A 3/9/2017    [facility]    UPPER GASTROINTESTINAL ENDOSCOPY  03/09/2017    Dr. Nam    URETERAL REIMPLANTION           Social History     Socioeconomic History    Marital status: Single     Spouse name: Not on file    Number of children: Not on file    Years of education: Not on file    Highest education level: Not on file   Occupational History    Occupation:    Social Needs    Financial resource strain: Hard    Food insecurity:     Worry: Sometimes true     Inability: Sometimes true    Transportation needs:     Medical: No     Non-medical: No   Tobacco Use    Smoking status: Current Every Day Smoker     Packs/day: 0.50     Years: 11.00     Pack years: 5.50     Types: Cigarettes     Start date: 1/1/2007    Smokeless tobacco: Never Used    Substance and Sexual Activity    Alcohol use: Yes     Alcohol/week: 0.0 oz     Frequency: Never     Drinks per session: Patient refused     Binge frequency: Never     Comment: drinks beer occassionally     Drug use: No    Sexual activity: Yes     Partners: Male     Birth control/protection: OCP   Lifestyle    Physical activity:     Days per week: 5 days     Minutes per session: 90 min    Stress: Very much   Relationships    Social connections:     Talks on phone: More than three times a week     Gets together: Never     Attends [employer] service: Not on file     Active member of club or organization: No     Attends meetings of clubs or organizations: Never     Relationship status: Living with partner   Other Topics Concern    Not on file   Social History Narrative    Not on file       Family History   Problem Relation Age of Onset    Pancreatic cancer Paternal Grandfather     Irritable bowel syndrome Mother     Bipolar disorder Father     Lupus Maternal Grandmother     Colon cancer Neg Hx     Colon polyps Neg Hx     Crohn's disease Neg Hx     Ulcerative colitis Neg Hx          OB History    Para Term  AB Living   0 0 0 0 0 0   SAB TAB Ectopic Multiple Live Births   0 0 0 0           COMPREHENSIVE GYN HISTORY:  PAP History: Denies abnormal Paps  Infection History: Denies STDs. Denies PID.  Benign History:Denies uterine fibroids. Denies ovarian cysts. Denies endometriosis Denies other conditions.  Cancer History: Denies cervical cancer. Denies uterine cancer or hyperplasia. Denies ovarian cancer. Denies vulvar cancer or pre-cancer. Denies vaginal cancer or pre-cancer. Denies breast cancer. Denies colon cancer.  Cycle: /    ROS:  GENERAL: Denies weight gain or weight loss. Feeling well overall.   SKIN: Denies rash or lesions.   HEAD: Denies headache.   NODES: Denies enlarged lymph nodes.   CHEST: Denies shortness of breath.   ABDOMEN: No abdominal pain, constipation, diarrhea,  "nausea, vomiting or rectal bleeding.   URINARY: No frequency, dysuria, hematuria, or burning on urination.  REPRODUCTIVE: See HPI.   BREASTS: The patient denies pain, lumps, or nipple discharge.       BP 98/70   Ht 5' 7" (1.702 m)   Wt 56.7 kg (125 lb)   LMP 08/23/2019 (Approximate)   BMI 19.58 kg/m²     APPEARANCE: Well nourished, well developed, in no acute distress.  NECK: Neck symmetric without  thyromegaly.  NODES: No inguinal, cervical lymph node enlargement.  CHEST: Lungs clear to auscultation.  HEART: Regular rate and rhythm, no murmurs, rubs or gallops.  ABDOMEN: Soft. No tenderness or masses. No hernias. No hepatosplenomegaly.  BREASTS: Symmetrical, no skin changes or visible lesions. No palpable masses, nipple discharge or adenopathy bilaterally. Nipple rings in place   PELVIC:   VULVA: No lesions. Normal female genitalia.  URETHRAL MEATUS: Normal size and location, no lesions, no prolapse.  URETHRA: No masses, tenderness, prolapse or scarring.  VAGINA: Moist and well rugated, no discharge, no significant cystocele or rectocele.  no active bleeding  CERVIX: No lesions and discharge.  UTERUS: Normal size, regular shape, mobile, non-tender, bladder base nontender.  ADNEXA: No masses or tenderness.    Data Reviewed:   Last Pap: Date: 10/2016 NILM    UPT negative    1. Hematuria, unspecified type    2. Asymptomatic microscopic hematuria        Plan:  Routine gyn s/p normal breast exam. Pap done today with HPV  2. Will do depo today. Understands that she will have AUB, sometimes amenorrhea vs spotting vs menometrorrhagia. Other side effects including hair loss, weight gain, decreased bone density and acne discussed.   3. Still with large amount of blood in urine, doesn't appear to be blood in vagina, will send for culture and if normal should continue to f/u with urologist for ultrasound. Had negative CT scan last month without stones.     F/u in 3 months      "

## 2019-09-19 NOTE — PROGRESS NOTES
9/19/19 @ 1600: Patient verified name and date of birth.  Injection questionnaire reviewed with patient prior to administration.  No contraindications noted.  Patient instructed to wait 15 minutes prior to leaving office to monitor for any adverse reactions.  Patient verbalized understanding.  Medroxyprogesterone 150mg/1 ml administered to right gluteal area per 's recommendation.  Notified that next injection is due on December 11th. Patient tolerated well. No signs of adverse reactions noted.        LOT #: [id number]  EXP DATE: 7/21      UPT indicated: yes (Result: negative)   Ordering provider: Maykel

## 2019-09-21 LAB — BACTERIA UR CULT: NO GROWTH

## 2019-09-26 LAB
HPV HR 12 DNA CVX QL NAA+PROBE: NEGATIVE
HPV16 AG SPEC QL: NEGATIVE
HPV18 DNA SPEC QL NAA+PROBE: NEGATIVE

## 2019-09-27 ENCOUNTER — PATIENT MESSAGE (OUTPATIENT)
Dept: OBSTETRICS AND GYNECOLOGY | Facility: HOSPITAL | Age: 29
End: 2019-09-27

## 2019-09-27 ENCOUNTER — PATIENT MESSAGE (OUTPATIENT)
Dept: OBSTETRICS AND GYNECOLOGY | Facility: CLINIC | Age: 29
End: 2019-09-27

## 2019-10-16 ENCOUNTER — PATIENT MESSAGE (OUTPATIENT)
Dept: FAMILY MEDICINE | Facility: CLINIC | Age: 29
End: 2019-10-16

## 2019-11-12 ENCOUNTER — TELEPHONE (OUTPATIENT)
Dept: FAMILY MEDICINE | Facility: CLINIC | Age: 29
End: 2019-11-12

## 2019-11-12 ENCOUNTER — PATIENT MESSAGE (OUTPATIENT)
Dept: FAMILY MEDICINE | Facility: CLINIC | Age: 29
End: 2019-11-12

## 2019-11-12 DIAGNOSIS — F41.1 GENERALIZED ANXIETY DISORDER: ICD-10-CM

## 2019-11-12 DIAGNOSIS — F51.01 PRIMARY INSOMNIA: ICD-10-CM

## 2019-11-13 RX ORDER — CLONAZEPAM 1 MG/1
1 TABLET ORAL NIGHTLY PRN
Qty: 30 TABLET | Refills: 2 | Status: SHIPPED | OUTPATIENT
Start: 2019-11-13 | End: 2019-12-20

## 2019-12-03 ENCOUNTER — TELEPHONE (OUTPATIENT)
Dept: OBSTETRICS AND GYNECOLOGY | Facility: CLINIC | Age: 29
End: 2019-12-03

## 2019-12-03 NOTE — TELEPHONE ENCOUNTER
----- Message from Bertha Kennedy sent at 12/3/2019 12:01 PM CST -----  Contact: Self [phone]  Patient is calling to see if she can come in sometime this week or next to get her Depo.

## 2019-12-12 ENCOUNTER — PATIENT OUTREACH (OUTPATIENT)
Dept: ADMINISTRATIVE | Facility: OTHER | Age: 29
End: 2019-12-12

## 2019-12-16 ENCOUNTER — CLINICAL SUPPORT (OUTPATIENT)
Dept: OBSTETRICS AND GYNECOLOGY | Facility: CLINIC | Age: 29
End: 2019-12-16
Payer: COMMERCIAL

## 2019-12-16 DIAGNOSIS — Z30.9 ENCOUNTER FOR CONTRACEPTIVE MANAGEMENT, UNSPECIFIED TYPE: Primary | ICD-10-CM

## 2019-12-16 RX ORDER — MEDROXYPROGESTERONE ACETATE 150 MG/ML
150 INJECTION, SUSPENSION INTRAMUSCULAR
Status: SHIPPED | OUTPATIENT
Start: 2019-12-16

## 2019-12-16 NOTE — PROGRESS NOTES
12/16/19 @ 8:30AM: Patient verified name and date of birth.  Injection questionnaire reviewed with patient prior to administration.  No contraindications noted.  Patient instructed to wait 15 minutes prior to leaving office to monitor for any adverse reactions.  Patient verbalized understanding.  Medroxyprogesterone 150mg/1 ml administered to left gluteal area per 's recommendation.  Patient tolerated well with no adverse reactions noted. Notified that next injection is due on March 9, 2020 and given depo calendar.        LOT #: [id number]  EXP DATE: 7/2021    Date of last visit: 9/19/19  Last Depo-Provera: 9/19/19  UPT indicated: No (Within 9 day window)   Ordering provider: Dr. Brar

## 2019-12-20 ENCOUNTER — OFFICE VISIT (OUTPATIENT)
Dept: FAMILY MEDICINE | Facility: CLINIC | Age: 29
End: 2019-12-20
Attending: FAMILY MEDICINE
Payer: COMMERCIAL

## 2019-12-20 VITALS
SYSTOLIC BLOOD PRESSURE: 100 MMHG | HEART RATE: 114 BPM | OXYGEN SATURATION: 98 % | WEIGHT: 125 LBS | BODY MASS INDEX: 19.62 KG/M2 | HEIGHT: 67 IN | DIASTOLIC BLOOD PRESSURE: 62 MMHG

## 2019-12-20 DIAGNOSIS — N32.89 BLADDER SPASMS: ICD-10-CM

## 2019-12-20 DIAGNOSIS — F41.1 GENERALIZED ANXIETY DISORDER: ICD-10-CM

## 2019-12-20 DIAGNOSIS — L70.0 ACNE VULGARIS: ICD-10-CM

## 2019-12-20 DIAGNOSIS — F51.01 PRIMARY INSOMNIA: Primary | ICD-10-CM

## 2019-12-20 PROCEDURE — 99214 OFFICE O/P EST MOD 30 MIN: CPT | Mod: S$GLB,,, | Performed by: FAMILY MEDICINE

## 2019-12-20 PROCEDURE — 99214 PR OFFICE/OUTPT VISIT, EST, LEVL IV, 30-39 MIN: ICD-10-PCS | Mod: S$GLB,,, | Performed by: FAMILY MEDICINE

## 2019-12-20 PROCEDURE — 99999 PR PBB SHADOW E&M-EST. PATIENT-LVL III: CPT | Mod: PBBFAC,,, | Performed by: FAMILY MEDICINE

## 2019-12-20 PROCEDURE — 99999 PR PBB SHADOW E&M-EST. PATIENT-LVL III: ICD-10-PCS | Mod: PBBFAC,,, | Performed by: FAMILY MEDICINE

## 2019-12-20 RX ORDER — CLONAZEPAM 1 MG/1
2 TABLET ORAL NIGHTLY PRN
Qty: 30 TABLET | Refills: 2 | Status: SHIPPED | OUTPATIENT
Start: 2019-12-20 | End: 2020-03-09

## 2019-12-20 RX ORDER — FLAVOXATE HYDROCHLORIDE 100 MG/1
100 TABLET ORAL 3 TIMES DAILY PRN
Qty: 30 TABLET | Refills: 2 | Status: SHIPPED | OUTPATIENT
Start: 2019-12-20 | End: 2020-04-02 | Stop reason: SDUPTHER

## 2019-12-20 NOTE — PROGRESS NOTES
Subjective:       Patient ID: Stephanie Harding is a 29 y.o. female.    Chief Complaint: Medication Refill    29 yr old pleasant white female with acne vulgaris, generalized anxiety disorder, presents today for evaluation of anxiety and depression. C/o bladder spasms.    Acne vulgaris - chronic - currently stable - she is on clindamycin gel - she also takes spironolactone daily      Anxiety disorder/depression - chronic - and currently uncontrolled - on wellbutrin daily and also celexa - no SI/HI/hallucinations - reports have issues with her family life and small things making her more anxious and depressed mood - no motivation or energy and sleep issues - she is on meds x 7 years and they seems to have no affect during her emotional moments - she is on klonopin as needed      History as below - reviewed      Health maintenance  -labs she reports UTD  -pap UTD  -vaccines declines    Medication Refill   This is a chronic problem. The current episode started more than 1 year ago. The problem occurs constantly. The problem has been gradually improving. Associated symptoms include a rash. Pertinent negatives include no abdominal pain, anorexia, arthralgias, chest pain, chills, congestion, coughing, diaphoresis, fever, headaches, joint swelling, myalgias, nausea, neck pain, numbness, sore throat, vertigo, vomiting or weakness. Nothing aggravates the symptoms. Treatments tried: as below. The treatment provided significant relief.   Rash   This is a chronic problem. The current episode started more than 1 year ago. The problem has been gradually improving since onset. The rash is characterized by swelling and draining. She was exposed to nothing. Pertinent negatives include no anorexia, congestion, cough, diarrhea, eye pain, facial edema, fever, nail changes, rhinorrhea, shortness of breath, sore throat or vomiting. Past treatments include antibiotic cream. The treatment provided no relief. There is no history of allergies,  asthma, eczema or varicella. (Acne)   Anxiety   Presents for follow-up visit. Onset was 1 to 5 years ago. The problem has been rapidly improving. Symptoms include decreased concentration, excessive worry and nervous/anxious behavior. Patient reports no chest pain, confusion, depressed mood, dizziness, dry mouth, feeling of choking, impotence, irritability, nausea, palpitations, restlessness, shortness of breath or suicidal ideas. Symptoms occur rarely. The severity of symptoms is moderate. Nothing aggravates the symptoms. The quality of sleep is good. Nighttime awakenings: none.     There are no known risk factors. Her past medical history is significant for anxiety/panic attacks. There is no history of asthma, bipolar disorder, CAD, chronic lung disease, depression, fibromyalgia or suicide attempts. (Acne) Past treatments include non-benzodiazephine anxiolytics. The treatment provided significant relief. Compliance with prior treatments has been good.     Review of Systems   Constitutional: Negative.  Negative for activity change, chills, diaphoresis, fever, irritability and unexpected weight change.   HENT: Negative.  Negative for congestion, ear discharge, hearing loss, rhinorrhea, sore throat, trouble swallowing and voice change.    Eyes: Negative.  Negative for pain, discharge and visual disturbance.   Respiratory: Negative.  Negative for cough, chest tightness, shortness of breath and wheezing.    Cardiovascular: Negative.  Negative for chest pain and palpitations.   Gastrointestinal: Negative.  Negative for abdominal distention, abdominal pain, anal bleeding, anorexia, blood in stool, constipation, diarrhea, nausea and vomiting.   Endocrine: Negative.  Negative for cold intolerance, polydipsia and polyuria.   Genitourinary: Negative.  Negative for decreased urine volume, difficulty urinating, dysuria, frequency, hematuria, impotence, menstrual problem and vaginal pain.   Musculoskeletal: Negative.  Negative  for arthralgias, gait problem, joint swelling, myalgias and neck pain.   Skin: Positive for rash. Negative for color change, nail changes, pallor and wound.   Allergic/Immunologic: Negative.  Negative for environmental allergies and immunocompromised state.   Neurological: Negative.  Negative for dizziness, vertigo, tremors, seizures, speech difficulty, weakness, numbness and headaches.   Hematological: Negative.  Negative for adenopathy. Does not bruise/bleed easily.   Psychiatric/Behavioral: Positive for decreased concentration. Negative for agitation, confusion, dysphoric mood, hallucinations, self-injury and suicidal ideas. The patient is nervous/anxious.        PMH/PSH/FH/SH/MED/ALLERGY reviewed    Objective:       Vitals:    12/20/19 0838   BP: 100/62   Pulse: (!) 114       Physical Exam   Constitutional: She is oriented to person, place, and time. She appears well-developed and well-nourished. No distress.   HENT:   Head: Normocephalic and atraumatic.   Right Ear: External ear normal.   Left Ear: External ear normal.   Nose: Nose normal.   Mouth/Throat: Oropharynx is clear and moist. No oropharyngeal exudate.   Mild to moderate nodular acne on face   Eyes: Pupils are equal, round, and reactive to light. Conjunctivae and EOM are normal. Right eye exhibits no discharge. Left eye exhibits no discharge. No scleral icterus.   Neck: Normal range of motion. Neck supple. No JVD present. No tracheal deviation present. No thyromegaly present.   Cardiovascular: Normal rate, regular rhythm, normal heart sounds and intact distal pulses. Exam reveals no gallop and no friction rub.   No murmur heard.  Pulmonary/Chest: Effort normal and breath sounds normal. No respiratory distress. She has no wheezes. She has no rales. She exhibits no tenderness.   Abdominal: Soft. Bowel sounds are normal. She exhibits no distension and no mass. There is no tenderness. There is no rebound and no guarding. No hernia.   Musculoskeletal:  Normal range of motion. She exhibits no edema or tenderness.   Lymphadenopathy:     She has no cervical adenopathy.   Neurological: She is alert and oriented to person, place, and time. She has normal reflexes. She displays normal reflexes. No cranial nerve deficit. She exhibits normal muscle tone. Coordination normal.   Skin: Skin is warm and dry. Rash noted. She is not diaphoretic. There is erythema.   Psychiatric: Her behavior is normal. Judgment and thought content normal.   Anxious and depressed mood       Assessment:       1. Primary insomnia    2. Generalized anxiety disorder    3. Acne vulgaris    4. Bladder spasms        Plan:           Stephanie was seen today for medication refill.    Diagnoses and all orders for this visit:    Primary insomnia  -     clonazePAM (KLONOPIN) 1 MG tablet; Take 2 tablets (2 mg total) by mouth nightly as needed for Anxiety.    Generalized anxiety disorder  -     clonazePAM (KLONOPIN) 1 MG tablet; Take 2 tablets (2 mg total) by mouth nightly as needed for Anxiety.    Acne vulgaris    Bladder spasms  -     flavoxATE (URISPAS) 100 mg Tab; Take 1 tablet (100 mg total) by mouth 3 (three) times daily as needed.        MEENU/depression/insomnia  -klonopin as needed once or twice/day  -recommend relaxation techniques as well  -SI/ER precautions given and also we discussed need for Psychiatry referral if meds not working in future    Acne -controlled    IBS - no new issues - baseline      Bladder spasms  -urispas prn    Spent adequate time in obtaining history and explaining differentials    25 minutes spent during this visit of which greater than 50% devoted to face-face counseling and coordination of care regarding diagnosis and management plan      Follow up in about 3 months (around 3/20/2020), or if symptoms worsen or fail to improve.

## 2020-03-02 DIAGNOSIS — L70.0 ACNE VULGARIS: ICD-10-CM

## 2020-03-03 RX ORDER — SPIRONOLACTONE 100 MG/1
TABLET, FILM COATED ORAL
Qty: 30 TABLET | Refills: 1 | Status: SHIPPED | OUTPATIENT
Start: 2020-03-03 | End: 2020-05-01

## 2020-03-08 DIAGNOSIS — F41.1 GENERALIZED ANXIETY DISORDER: ICD-10-CM

## 2020-03-08 DIAGNOSIS — F51.01 PRIMARY INSOMNIA: ICD-10-CM

## 2020-03-09 RX ORDER — CLONAZEPAM 1 MG/1
2 TABLET ORAL NIGHTLY PRN
Qty: 30 TABLET | Refills: 2 | Status: SHIPPED | OUTPATIENT
Start: 2020-03-09 | End: 2020-04-02

## 2020-03-18 ENCOUNTER — CLINICAL SUPPORT (OUTPATIENT)
Dept: OBSTETRICS AND GYNECOLOGY | Facility: CLINIC | Age: 30
End: 2020-03-18
Payer: COMMERCIAL

## 2020-03-18 DIAGNOSIS — Z30.9 ENCOUNTER FOR CONTRACEPTIVE MANAGEMENT, UNSPECIFIED TYPE: Primary | ICD-10-CM

## 2020-03-18 PROCEDURE — 99999 PR PBB SHADOW E&M-EST. PATIENT-LVL I: CPT | Mod: PBBFAC,,,

## 2020-03-18 PROCEDURE — 99999 PR PBB SHADOW E&M-EST. PATIENT-LVL I: ICD-10-PCS | Mod: PBBFAC,,,

## 2020-03-18 RX ORDER — MEDROXYPROGESTERONE ACETATE 150 MG/ML
150 INJECTION, SUSPENSION INTRAMUSCULAR
Status: SHIPPED | OUTPATIENT
Start: 2020-03-18

## 2020-03-18 NOTE — PROGRESS NOTES
3/18/2020 @ 4:15pm: Patient verified name and date of birth.  Injection questionnaire reviewed with patient prior to administration.  No contraindications noted.  Patient instructed to wait 15 minutes prior to leaving office to monitor for any adverse reactions.  Patient verbalized understanding.  Medroxyprogesterone 150mg/1 ml administered to left gluteal area per 's recommendation.  Patient tolerated well with no adverse reactions noted. Notified that next injection is due on June 9, 2020 and given depo calendar.      LOT #: [id number]  EXP DATE: March 2021      UPT indicated: no  Ordering provider: Maykel

## 2020-03-19 ENCOUNTER — PATIENT OUTREACH (OUTPATIENT)
Dept: ADMINISTRATIVE | Facility: HOSPITAL | Age: 30
End: 2020-03-19

## 2020-04-01 ENCOUNTER — TELEPHONE (OUTPATIENT)
Dept: FAMILY MEDICINE | Facility: CLINIC | Age: 30
End: 2020-04-01

## 2020-04-02 ENCOUNTER — OFFICE VISIT (OUTPATIENT)
Dept: FAMILY MEDICINE | Facility: CLINIC | Age: 30
End: 2020-04-02
Attending: FAMILY MEDICINE
Payer: COMMERCIAL

## 2020-04-02 ENCOUNTER — PATIENT MESSAGE (OUTPATIENT)
Dept: FAMILY MEDICINE | Facility: CLINIC | Age: 30
End: 2020-04-02

## 2020-04-02 DIAGNOSIS — F41.1 GENERALIZED ANXIETY DISORDER: Primary | ICD-10-CM

## 2020-04-02 DIAGNOSIS — N32.89 BLADDER SPASMS: ICD-10-CM

## 2020-04-02 DIAGNOSIS — F51.01 PRIMARY INSOMNIA: ICD-10-CM

## 2020-04-02 PROCEDURE — 99214 PR OFFICE/OUTPT VISIT, EST, LEVL IV, 30-39 MIN: ICD-10-PCS | Mod: 95,,, | Performed by: FAMILY MEDICINE

## 2020-04-02 PROCEDURE — 99214 OFFICE O/P EST MOD 30 MIN: CPT | Mod: 95,,, | Performed by: FAMILY MEDICINE

## 2020-04-02 RX ORDER — CLONAZEPAM 2 MG/1
2 TABLET ORAL NIGHTLY
Qty: 30 TABLET | Refills: 2 | Status: SHIPPED | OUTPATIENT
Start: 2020-04-02 | End: 2020-08-13

## 2020-04-02 RX ORDER — FLAVOXATE HYDROCHLORIDE 100 MG/1
100 TABLET ORAL 3 TIMES DAILY PRN
Qty: 30 TABLET | Refills: 2 | Status: SHIPPED | OUTPATIENT
Start: 2020-04-02 | End: 2021-04-02

## 2020-04-02 NOTE — PROGRESS NOTES
The patient location is: home  The chief complaint leading to consultation is: anxiety, insomnia  Visit type: Virtual visit with synchronous audio and video  Total time spent with patient: 15 min  Each patient to whom he or she provides medical services by telemedicine is:  (1) informed of the relationship between the physician and patient and the respective role of any other health care provider with respect to management of the patient; and (2) notified that he or she may decline to receive medical services by telemedicine and may withdraw from such care at any time.    Notes: 30 yr old pleasant white female with acne vulgaris, generalized anxiety disorder, presents today for virtual/telemedicine visit for evaluation of anxiety and depression.      Bladder spasms - much better - using medicine as needed - it is working     Acne vulgaris - chronic - currently stable - she is on clindamycin gel - she also takes spironolactone daily        Anxiety disorder/depression - chronic - and currently uncontrolled - on wellbutrin daily and also celexa - no SI/HI/hallucinations - reports have issues with her family life and small things making her more anxious and depressed mood - no motivation or energy and sleep issues - she is on meds x 7 years and they seems to have no affect during her emotional moments - she is on klonopin as needed        History as below - reviewed        Health maintenance  -labs she reports UTD  -pap UTD  -vaccines declines     Medication Refill   This is a chronic problem. The current episode started more than 1 year ago. The problem occurs constantly. The problem has been gradually improving. Associated symptoms include a rash. Pertinent negatives include no abdominal pain, anorexia, arthralgias, chest pain, chills, congestion, coughing, diaphoresis, fever, headaches, joint swelling, myalgias, nausea, neck pain, numbness, sore throat, vertigo, vomiting or weakness. Nothing aggravates the symptoms.  Treatments tried: as below. The treatment provided significant relief.   Rash   This is a chronic problem. The current episode started more than 1 year ago. The problem has been gradually improving since onset. The rash is characterized by swelling and draining. She was exposed to nothing. Pertinent negatives include no anorexia, congestion, cough, diarrhea, eye pain, facial edema, fever, nail changes, rhinorrhea, shortness of breath, sore throat or vomiting. Past treatments include antibiotic cream. The treatment provided no relief. There is no history of allergies, asthma, eczema or varicella. (Acne)   Anxiety   Presents for follow-up visit. Onset was 1 to 5 years ago. The problem has been rapidly improving. Symptoms include decreased concentration, excessive worry and nervous/anxious behavior. Patient reports no chest pain, confusion, depressed mood, dizziness, dry mouth, feeling of choking, impotence, irritability, nausea, palpitations, restlessness, shortness of breath or suicidal ideas. Symptoms occur rarely. The severity of symptoms is moderate. Nothing aggravates the symptoms. The quality of sleep is good. Nighttime awakenings: none.      There are no known risk factors. Her past medical history is significant for anxiety/panic attacks. There is no history of asthma, bipolar disorder, CAD, chronic lung disease, depression, fibromyalgia or suicide attempts. (Acne) Past treatments include non-benzodiazephine anxiolytics. The treatment provided significant relief. Compliance with prior treatments has been good.      Review of Systems   Constitutional: Negative.  Negative for activity change, chills, diaphoresis, fever, irritability and unexpected weight change.   HENT: Negative.  Negative for congestion, ear discharge, hearing loss, rhinorrhea, sore throat, trouble swallowing and voice change.    Eyes: Negative.  Negative for pain, discharge and visual disturbance.   Respiratory: Negative.  Negative for cough,  chest tightness, shortness of breath and wheezing.    Cardiovascular: Negative.  Negative for chest pain and palpitations.   Gastrointestinal: Negative.  Negative for abdominal distention, abdominal pain, anal bleeding, anorexia, blood in stool, constipation, diarrhea, nausea and vomiting.   Endocrine: Negative.  Negative for cold intolerance, polydipsia and polyuria.   Genitourinary: Negative.  Negative for decreased urine volume, difficulty urinating, dysuria, frequency, hematuria, impotence, menstrual problem and vaginal pain.   Musculoskeletal: Negative.  Negative for arthralgias, gait problem, joint swelling, myalgias and neck pain.   Skin: Positive for rash. Negative for color change, nail changes, pallor and wound.   Allergic/Immunologic: Negative.  Negative for environmental allergies and immunocompromised state.   Neurological: Negative.  Negative for dizziness, vertigo, tremors, seizures, speech difficulty, weakness, numbness and headaches.   Hematological: Negative.  Negative for adenopathy. Does not bruise/bleed easily.   Psychiatric/Behavioral: Positive for decreased concentration. Negative for agitation, confusion, dysphoric mood, hallucinations, self-injury and suicidal ideas. The patient is nervous/anxious.        PMH/PSH/FH/SH/MED/ALLERGY reviewed    Active Ambulatory Problems     Diagnosis Date Noted    Acne vulgaris 12/14/2015    Generalized anxiety disorder 12/14/2015    Bile salt-induced diarrhea 03/04/2016    Irritable bowel syndrome with diarrhea 03/01/2017    Sleep apnea     Major depressive disorder, recurrent, moderate 03/31/2017    Primary insomnia 03/31/2017    Chronic diarrhea 03/31/2017    Smoker 02/13/2019     Resolved Ambulatory Problems     Diagnosis Date Noted    Fatigue 03/04/2016    Abdominal tenderness, right upper quadrant 03/04/2016    Non-intractable vomiting with nausea 03/01/2017    Nausea & vomiting 03/09/2017    Left upper quadrant abdominal pain of unknown  etiology 03/10/2017    Acute pyelonephritis 02/13/2019    Pyelonephritis 02/13/2019    Pyelonephritis 02/14/2019     Past Medical History:   Diagnosis Date    Anxiety     HSV-2 (herpes simplex virus 2) infection 2014    Irritable bowel syndrome      Past Surgical History:   Procedure Laterality Date    CHOLECYSTECTOMY  2012    COLONOSCOPY N/A 5/2/2017    Procedure: COLONOSCOPY;  Surgeon: Bib Haq Jr., MD;  Location: [facility];  Service: Endoscopy;  Laterality: N/A;    UPPER GASTROINTESTINAL ENDOSCOPY  03/09/2017    Dr. Nam    URETERAL REIMPLANTION       Family History   Problem Relation Age of Onset    Pancreatic cancer Paternal Grandfather     Irritable bowel syndrome Mother     Bipolar disorder Father     Lupus Maternal Grandmother     Colon cancer Neg Hx     Colon polyps Neg Hx     Crohn's disease Neg Hx     Ulcerative colitis Neg Hx      Social History     Socioeconomic History    Marital status: Single     Spouse name: Not on file    Number of children: Not on file    Years of education: Not on file    Highest education level: Not on file   Occupational History    Occupation:    Social Needs    Financial resource strain: Hard    Food insecurity:     Worry: Sometimes true     Inability: Sometimes true    Transportation needs:     Medical: No     Non-medical: No   Tobacco Use    Smoking status: Current Every Day Smoker     Packs/day: 0.50     Years: 11.00     Pack years: 5.50     Types: Cigarettes     Start date: 1/1/2007    Smokeless tobacco: Never Used   Substance and Sexual Activity    Alcohol use: Yes     Alcohol/week: 0.0 standard drinks     Frequency: Never     Drinks per session: Patient refused     Binge frequency: Never     Comment: drinks beer occassionally     Drug use: No    Sexual activity: Yes     Partners: Male     Birth control/protection: OCP   Lifestyle    Physical activity:     Days per week: 5 days     Minutes per session:  90 min    Stress: Very much   Relationships    Social connections:     Talks on phone: More than three times a week     Gets together: Never     Attends Confucianist service: Not on file     Active member of club or organization: No     Attends meetings of clubs or organizations: Never     Relationship status: Living with partner   Other Topics Concern    Not on file   Social History Narrative    Not on file     Review of patient's allergies indicates:  No Known Allergies  Current Outpatient Medications on File Prior to Visit   Medication Sig Dispense Refill    spironolactone (ALDACTONE) 100 MG tablet TAKE 1 TABLET BY MOUTH EVERY DAY 30 tablet 1    [DISCONTINUED] clonazePAM (KLONOPIN) 1 MG tablet TAKE 2 TABLETS (2 MG TOTAL) BY MOUTH NIGHTLY AS NEEDED FOR ANXIETY. 30 tablet 2    [DISCONTINUED] flavoxATE (URISPAS) 100 mg Tab Take 1 tablet (100 mg total) by mouth 3 (three) times daily as needed. 30 tablet 2     Current Facility-Administered Medications on File Prior to Visit   Medication Dose Route Frequency Provider Last Rate Last Dose    medroxyPROGESTERone (DEPO-PROVERA) injection 150 mg  150 mg Intramuscular 1 time in Clinic/[facility] Janiya Brar MD        medroxyPROGESTERone (DEPO-PROVERA) injection 150 mg  150 mg Intramuscular 1 time in Clinic/ENDER Brar MD        medroxyPROGESTERone (DEPO-PROVERA) injection 150 mg  150 mg Intramuscular 1 time in Clinic/ENDER Brar MD         No vitals or PE done since this is virtual visit      Diagnoses and all orders for this visit:    Generalized anxiety disorder  -     clonazePAM (KLONOPIN) 2 MG Tab; Take 1 tablet (2 mg total) by mouth every evening.    Bladder spasms  -     flavoxATE (URISPAS) 100 mg Tab; Take 1 tablet (100 mg total) by mouth 3 (three) times daily as needed.    Primary insomnia  -     clonazePAM (KLONOPIN) 2 MG Tab; Take 1 tablet (2 mg total) by mouth every evening.      MEENU/depression/insomnia  -klonopin  2mg as needed once a day  -recommend  relaxation techniques as well  -SI/ER precautions given and also we discussed need for Psychiatry referral if meds not working in future     Acne -controlled     IBS - no new issues - baseline        Bladder spasms  -urispas prn     Spent adequate time in obtaining history and explaining differentials    RTC prn worsening    Answers for HPI/ROS submitted by the patient on 3/26/2020   activity change: No  unexpected weight change: No  neck pain: No  hearing loss: No  rhinorrhea: No  trouble swallowing: No  eye discharge: No  visual disturbance: No  chest tightness: No  wheezing: No  chest pain: No  palpitations: No  blood in stool: No  constipation: No  vomiting: No  diarrhea: No  polydipsia: No  polyuria: No  difficulty urinating: No  hematuria: No  menstrual problem: No  dysuria: No  joint swelling: No  arthralgias: No  headaches: No  weakness: No  confusion: No  dysphoric mood: No

## 2020-04-19 NOTE — TELEPHONE ENCOUNTER
"Yes, if you still experience poor sleep quality/feel tired during the day we should still do in lab study.  I will order it .    SLEEP LAB ([facility]) will contact you to schedulethe sleep study after she gets "OK" from insurance. Their number is [phone] (ext [phone]). Please call them if you do not hear from them in 10 business days from now.  [facility] is located on 7th floor of the [facility]; [place] [facility] is located in [facility].    SLEEP CLINIC (my assistant) will call you when the sleep study results are ready - if you have not heard from us by 2 weeks from the date of the study, please call [phone] (ext 2) or you can use [facility] to contact me.      Sincerely,    Dr. Seth            ===View-only below this line===      ----- Message -----     From: Stephanie Harding     Sent: 4/12/2017  3:56 PM CDT       To: Marley Seth MD  Subject: RE: Test Results    I would like to do the in lab study, as long as she feels that it is necessary for my health.  ----- Message -----  From: Med Assistant Endy KHAN  Sent: 4/12/2017  3:46 PM CDT  To: Stephanie Harding  Subject: RE: Test Results    Dr. Seth wanted me to inform you of sleep study results.  Although there was a suggestion of mild sleep apnea on her home study, she has not met sleep apnea criteria.  Since home sleep studies are less sensitive than in lab study and can miss some shallow breathing events will she be willing o do in lab study to clarify the picture?     Thanks!       ----- Message -----     From: Stephanie Harding     Sent: 4/12/2017  3:27 PM CDT       To: Marley Seth MD  Subject: Test Results    Doctor,    It has been over a month and I still have not been contacted about the at home sleep study. Do you have any idea when I should expect this?    Thanks,  Stephanie    " no nausea/no vomiting/no decreased eating/drinking

## 2020-05-01 DIAGNOSIS — L70.0 ACNE VULGARIS: ICD-10-CM

## 2020-05-01 RX ORDER — SPIRONOLACTONE 100 MG/1
TABLET, FILM COATED ORAL
Qty: 30 TABLET | Refills: 0 | Status: SHIPPED | OUTPATIENT
Start: 2020-05-01 | End: 2020-06-04 | Stop reason: SDUPTHER

## 2020-06-29 ENCOUNTER — PATIENT MESSAGE (OUTPATIENT)
Dept: FAMILY MEDICINE | Facility: CLINIC | Age: 30
End: 2020-06-29

## 2020-06-29 NOTE — TELEPHONE ENCOUNTER
Hi this is Dr Roque covering for Dr. Wang.  This is just a note that he is out of the office for the rest of this week but should be back in next week.  I will keep this in his box so he can review this when he gets back.    Elier Roque MD    ===View-only below this line===      ----- Message -----     From: Stephanie Harding     Sent: 6/29/2020  9:58 AM CDT       To: Aydin Wang MD  Subject: Non-Urgent Medical    Hello Dr. Wang!    I am still in [place], at least until the middle of July. But I was writing to ask you about the letter that you had provided me for [place]. I am looking to get a new apartment when I get back down to [place], and the letter has to be updated every year. Please let me know if this is something you are able to do, and if you would like for me to set up an appointment. I should be back around the 12th, but am going to be pursuing a new apartment while still here.    As always thank you for all your help and guidance,    Stephanie

## 2020-06-29 NOTE — LETTER
July 7, 2020    Stephanie Harding  [address]  [place] [address]         [facility]  [address]  [facility] [address]  Phone: [phone]  Fax: [phone] July 7, 2020     Patient: Stephanie Harding   YOB: 1990   Date of Visit: 6/29/2020       To Whom It May Concern:    Ms. Stephanie Harding is a patient under my care for depression and generalized anxiety disorder. Her  canine serves as an emotional support animal and mitigates the effects of her emotional disorder. Her dog meets the definition of an emotional support animal under the Fair Housing Act. It is my medical opinion that her conditions meet the definition of a mental impairment under the Americans with Disabilities Act.        If you have any questions or concerns, please don't hesitate to call.      Sincerely,        Aydin Wang MD

## 2020-07-06 DIAGNOSIS — Z79.899 ENCOUNTER FOR LONG-TERM (CURRENT) USE OF HIGH-RISK MEDICATION: Primary | ICD-10-CM

## 2020-07-07 ENCOUNTER — TELEPHONE (OUTPATIENT)
Dept: DERMATOLOGY | Facility: CLINIC | Age: 30
End: 2020-07-07

## 2020-07-07 NOTE — TELEPHONE ENCOUNTER
Tried to reach pt. No answer, will try again later and I left a message on answering machine.    Contacting to inform pt of openings in providers schedule.

## 2020-07-07 NOTE — TELEPHONE ENCOUNTER
----- Message from Nila Oro MD sent at 7/6/2020  5:58 PM CDT -----  Pt needs virtual visit for spironolactone med refill. Also needs labs. Hasnt had any labs since 2019 I think. Will place labs. Can do VV on Weds at 12:15 or 3

## 2020-07-07 NOTE — TELEPHONE ENCOUNTER
Pt is in [place] until October due to COVID and r/f infection. Pt will not be able to do a Vv at this time due to location. Pt will see about seeing someone up there for treatment.

## 2020-10-25 ENCOUNTER — PATIENT MESSAGE (OUTPATIENT)
Dept: FAMILY MEDICINE | Facility: CLINIC | Age: 30
End: 2020-10-25

## 2020-11-09 ENCOUNTER — PATIENT MESSAGE (OUTPATIENT)
Dept: FAMILY MEDICINE | Facility: CLINIC | Age: 30
End: 2020-11-09

## 2020-11-19 ENCOUNTER — TELEPHONE (OUTPATIENT)
Dept: FAMILY MEDICINE | Facility: CLINIC | Age: 30
End: 2020-11-19

## 2021-04-12 ENCOUNTER — PATIENT MESSAGE (OUTPATIENT)
Dept: ADMINISTRATIVE | Facility: HOSPITAL | Age: 31
End: 2021-04-12

## 2021-07-06 ENCOUNTER — PATIENT MESSAGE (OUTPATIENT)
Dept: ADMINISTRATIVE | Facility: HOSPITAL | Age: 31
End: 2021-07-06

## 2021-10-04 ENCOUNTER — PATIENT MESSAGE (OUTPATIENT)
Dept: ADMINISTRATIVE | Facility: HOSPITAL | Age: 31
End: 2021-10-04

## 2022-01-10 ENCOUNTER — PATIENT MESSAGE (OUTPATIENT)
Dept: ADMINISTRATIVE | Facility: HOSPITAL | Age: 32
End: 2022-01-10
Payer: COMMERCIAL

## 2022-05-31 ENCOUNTER — PATIENT MESSAGE (OUTPATIENT)
Dept: ADMINISTRATIVE | Facility: HOSPITAL | Age: 32
End: 2022-05-31
Payer: COMMERCIAL